# Patient Record
Sex: MALE | Race: WHITE | NOT HISPANIC OR LATINO | Employment: OTHER | URBAN - METROPOLITAN AREA
[De-identification: names, ages, dates, MRNs, and addresses within clinical notes are randomized per-mention and may not be internally consistent; named-entity substitution may affect disease eponyms.]

---

## 2020-01-16 ENCOUNTER — OFFICE VISIT (OUTPATIENT)
Dept: FAMILY MEDICINE CLINIC | Facility: CLINIC | Age: 55
End: 2020-01-16
Payer: COMMERCIAL

## 2020-01-16 VITALS
DIASTOLIC BLOOD PRESSURE: 88 MMHG | OXYGEN SATURATION: 96 % | SYSTOLIC BLOOD PRESSURE: 138 MMHG | HEART RATE: 96 BPM | RESPIRATION RATE: 16 BRPM | TEMPERATURE: 98 F | HEIGHT: 68 IN | WEIGHT: 202.6 LBS | BODY MASS INDEX: 30.71 KG/M2

## 2020-01-16 DIAGNOSIS — F41.9 ANXIETY: Primary | ICD-10-CM

## 2020-01-16 DIAGNOSIS — Z13.6 SCREENING FOR CARDIOVASCULAR CONDITION: ICD-10-CM

## 2020-01-16 DIAGNOSIS — N52.8 OTHER MALE ERECTILE DYSFUNCTION: ICD-10-CM

## 2020-01-16 DIAGNOSIS — Z12.5 PROSTATE CANCER SCREENING: ICD-10-CM

## 2020-01-16 PROCEDURE — 3008F BODY MASS INDEX DOCD: CPT | Performed by: FAMILY MEDICINE

## 2020-01-16 PROCEDURE — 99203 OFFICE O/P NEW LOW 30 MIN: CPT | Performed by: FAMILY MEDICINE

## 2020-01-16 RX ORDER — VARDENAFIL HYDROCHLORIDE 20 MG/1
20 TABLET ORAL DAILY PRN
Qty: 4 TABLET | Refills: 10 | Status: SHIPPED | OUTPATIENT
Start: 2020-01-16 | End: 2021-01-21 | Stop reason: SDUPTHER

## 2020-01-16 RX ORDER — VENLAFAXINE HYDROCHLORIDE 37.5 MG/1
37.5 CAPSULE, EXTENDED RELEASE ORAL DAILY
Qty: 90 CAPSULE | Refills: 3 | Status: SHIPPED | OUTPATIENT
Start: 2020-01-16 | End: 2021-02-19 | Stop reason: SDUPTHER

## 2020-01-16 RX ORDER — VARDENAFIL HYDROCHLORIDE 20 MG/1
TABLET ORAL
COMMUNITY
Start: 2019-12-02 | End: 2020-01-16 | Stop reason: SDUPTHER

## 2020-01-16 RX ORDER — VENLAFAXINE HYDROCHLORIDE 37.5 MG/1
37.5 CAPSULE, EXTENDED RELEASE ORAL DAILY
COMMUNITY
End: 2020-01-16 | Stop reason: SDUPTHER

## 2020-01-16 NOTE — PROGRESS NOTES
Subjective:           Problem List Items Addressed This Visit     None              No orders of the defined types were placed in this encounter  Patient Instructions   Stay current with colon and prostate screening Labs as orderwd  BMI Counseling: There is no height or weight on file to calculate BMI  Discussed the patient's BMI with him  The BMI is above normal  Nutrition recommendations include decreasing overall calorie intake and moderation in carbohydrate intake  Mliss Milo    No chief complaint on file  HPI Sera Funez is in for evaluation follow-up medication review refills as needed  He is 57-year-old male Anxiety ED    There were no vitals taken for this visit  Allergies not on file    No current outpatient medications on file prior to visit  No current facility-administered medications on file prior to visit  Past Medical History:   Diagnosis Date    Closed fracture of thoracic vertebral body ([facility] [address] ) 2014       No past surgical history on file  reports that he has never smoked  He does not have any smokeless tobacco history on file  He reports that he drinks alcohol  reports that he has never smoked  He does not have any smokeless tobacco history on file  Review of Systems   Constitutional: Negative for appetite change, diaphoresis and fever  HENT: Negative for congestion, ear pain, postnasal drip, sinus pressure, tinnitus and voice change  Eyes: Negative for discharge and itching  Respiratory: Negative for cough, chest tightness, shortness of breath and stridor  Cardiovascular: Negative for chest pain and palpitations  Gastrointestinal: Negative for abdominal distention, blood in stool, diarrhea and vomiting  Endocrine: Negative for cold intolerance  Genitourinary: Negative for flank pain, genital sores and testicular pain  Musculoskeletal: Negative for arthralgias, joint swelling, myalgias and neck stiffness     Skin: Negative for rash    Neurological: Negative for tremors, speech difficulty and headaches  Hematological: Negative for adenopathy  Psychiatric/Behavioral: Negative for behavioral problems, dysphoric mood, hallucinations and suicidal ideas  The patient is nervous/anxious  Physical Exam   Constitutional: He is oriented to person, place, and time  He appears well-developed and well-nourished  HENT:   Head: Normocephalic and atraumatic  Right Ear: External ear normal    Left Ear: External ear normal    Mouth/Throat: No oropharyngeal exudate  Eyes: Pupils are equal, round, and reactive to light  Conjunctivae and EOM are normal  Right eye exhibits no discharge  Left eye exhibits no discharge  No scleral icterus  Neck: Normal range of motion  Neck supple  No tracheal deviation present  Cardiovascular: Normal rate and regular rhythm  Exam reveals no friction rub  No murmur heard  Pulmonary/Chest: Effort normal and breath sounds normal  No stridor  No respiratory distress  He has no wheezes  He has no rales  Abdominal: Soft  Bowel sounds are normal  He exhibits no distension  There is no rebound and no guarding  Musculoskeletal: Normal range of motion  He exhibits no edema or deformity  Lymphadenopathy:     He has no cervical adenopathy  Neurological: He is alert and oriented to person, place, and time  No cranial nerve deficit  He exhibits normal muscle tone  Coordination normal    Skin: Skin is warm and dry  Capillary refill takes less than 2 seconds  No rash noted  Psychiatric: He has a normal mood and affect  His behavior is normal  Judgment and thought content normal    Nursing note and vitals reviewed

## 2020-01-28 LAB
ALBUMIN SERPL-MCNC: 4.6 G/DL (ref 3.8–4.9)
ALBUMIN/GLOB SERPL: 1.8 {RATIO} (ref 1.2–2.2)
ALP SERPL-CCNC: 51 IU/L (ref 39–117)
ALT SERPL-CCNC: 45 IU/L (ref 0–44)
AST SERPL-CCNC: 33 IU/L (ref 0–40)
BILIRUB SERPL-MCNC: 0.2 MG/DL (ref 0–1.2)
BUN SERPL-MCNC: 14 MG/DL (ref 6–24)
BUN/CREAT SERPL: 16 (ref 9–20)
CALCIUM SERPL-MCNC: 9.2 MG/DL (ref 8.7–10.2)
CHLORIDE SERPL-SCNC: 103 MMOL/L (ref 96–106)
CHOLEST SERPL-MCNC: 234 MG/DL (ref 100–199)
CO2 SERPL-SCNC: 20 MMOL/L (ref 20–29)
CREAT SERPL-MCNC: 0.9 MG/DL (ref 0.76–1.27)
GLOBULIN SER-MCNC: 2.5 G/DL (ref 1.5–4.5)
GLUCOSE SERPL-MCNC: 108 MG/DL (ref 65–99)
HDLC SERPL-MCNC: 78 MG/DL
LDLC SERPL CALC-MCNC: 143 MG/DL (ref 0–99)
POTASSIUM SERPL-SCNC: 4 MMOL/L (ref 3.5–5.2)
PROT SERPL-MCNC: 7.1 G/DL (ref 6–8.5)
PSA SERPL-MCNC: 1.1 NG/ML (ref 0–4)
SL AMB EGFR AFRICAN AMERICAN: 113 ML/MIN/1.73
SL AMB EGFR NON AFRICAN AMERICAN: 98 ML/MIN/1.73
SL AMB VLDL CHOLESTEROL CALC: 13 MG/DL (ref 5–40)
SODIUM SERPL-SCNC: 139 MMOL/L (ref 134–144)
TRIGL SERPL-MCNC: 65 MG/DL (ref 0–149)

## 2020-02-17 ENCOUNTER — OFFICE VISIT (OUTPATIENT)
Dept: FAMILY MEDICINE CLINIC | Facility: CLINIC | Age: 55
End: 2020-02-17
Payer: COMMERCIAL

## 2020-02-17 VITALS
HEART RATE: 96 BPM | SYSTOLIC BLOOD PRESSURE: 148 MMHG | RESPIRATION RATE: 16 BRPM | WEIGHT: 205.6 LBS | OXYGEN SATURATION: 98 % | BODY MASS INDEX: 31.16 KG/M2 | HEIGHT: 68 IN | DIASTOLIC BLOOD PRESSURE: 82 MMHG | TEMPERATURE: 98.2 F

## 2020-02-17 DIAGNOSIS — M77.12 LATERAL EPICONDYLITIS OF LEFT ELBOW: ICD-10-CM

## 2020-02-17 DIAGNOSIS — Z00.00 PHYSICAL EXAM: Primary | ICD-10-CM

## 2020-02-17 DIAGNOSIS — N52.8 OTHER MALE ERECTILE DYSFUNCTION: ICD-10-CM

## 2020-02-17 DIAGNOSIS — E78.2 MIXED HYPERLIPIDEMIA: ICD-10-CM

## 2020-02-17 DIAGNOSIS — F41.9 ANXIETY: ICD-10-CM

## 2020-02-17 PROCEDURE — 99396 PREV VISIT EST AGE 40-64: CPT | Performed by: FAMILY MEDICINE

## 2020-02-17 PROCEDURE — 3008F BODY MASS INDEX DOCD: CPT | Performed by: FAMILY MEDICINE

## 2020-02-17 NOTE — PATIENT INSTRUCTIONS
Say current with colon and prostate screening Low carb low fat diet  Continue medicatiuons   Recent Results (from the past 1344 hour(s))   Comprehensive metabolic panel    Collection Time: 01/27/20  9:14 AM   Result Value Ref Range    Glucose, Random 108 (H) 65 - 99 mg/dL    BUN 14 6 - 24 mg/dL    Creatinine 0 90 0 76 - 1 27 mg/dL    eGFR Non  98 >59 mL/min/1 73    eGFR  113 >59 mL/min/1 73    SL AMB BUN/CREATININE RATIO 16 9 - 20    Sodium 139 134 - 144 mmol/L    Potassium 4 0 3 5 - 5 2 mmol/L    Chloride 103 96 - 106 mmol/L    CO2 20 20 - 29 mmol/L    CALCIUM 9 2 8 7 - 10 2 mg/dL    Protein, Total 7 1 6 0 - 8 5 g/dL    Albumin 4 6 3 8 - 4 9 g/dL    Globulin, Total 2 5 1 5 - 4 5 g/dL    Albumin/Globulin Ratio 1 8 1 2 - 2 2    TOTAL BILIRUBIN 0 2 0 0 - 1 2 mg/dL    Alk Phos Isoenzymes 51 39 - 117 IU/L    AST 33 0 - 40 IU/L    ALT 45 (H) 0 - 44 IU/L   Lipid panel    Collection Time: 01/27/20  9:14 AM   Result Value Ref Range    Cholesterol, Total 234 (H) 100 - 199 mg/dL    Triglycerides 65 0 - 149 mg/dL    HDL 78 >39 mg/dL    VLDL Cholesterol Calculated 13 5 - 40 mg/dL    LDL Direct 143 (H) 0 - 99 mg/dL   PSA Total, Diagnostic    Collection Time: 01/27/20  9:14 AM   Result Value Ref Range    Prostate Specific Antigen Total 1 1 0 0 - 4 0 ng/mL     Low salt diet     Goal BP <135/85     Red yeast rice 600mg daily at night

## 2020-10-20 ENCOUNTER — OFFICE VISIT (OUTPATIENT)
Dept: FAMILY MEDICINE CLINIC | Facility: CLINIC | Age: 55
End: 2020-10-20
Payer: COMMERCIAL

## 2020-10-20 VITALS
RESPIRATION RATE: 18 BRPM | HEART RATE: 110 BPM | HEIGHT: 68 IN | TEMPERATURE: 98.3 F | DIASTOLIC BLOOD PRESSURE: 102 MMHG | SYSTOLIC BLOOD PRESSURE: 140 MMHG | BODY MASS INDEX: 31.01 KG/M2 | OXYGEN SATURATION: 96 % | WEIGHT: 204.6 LBS

## 2020-10-20 DIAGNOSIS — Z12.5 SCREENING FOR PROSTATE CANCER: Primary | ICD-10-CM

## 2020-10-20 DIAGNOSIS — I83.813 VARICOSE VEINS OF BOTH LOWER EXTREMITIES WITH PAIN: Primary | ICD-10-CM

## 2020-10-20 PROCEDURE — 1036F TOBACCO NON-USER: CPT | Performed by: FAMILY MEDICINE

## 2020-10-20 PROCEDURE — 99213 OFFICE O/P EST LOW 20 MIN: CPT | Performed by: FAMILY MEDICINE

## 2020-11-19 ENCOUNTER — TELEPHONE (OUTPATIENT)
Dept: ADMINISTRATIVE | Facility: HOSPITAL | Age: 55
End: 2020-11-19

## 2020-11-20 ENCOUNTER — CONSULT (OUTPATIENT)
Dept: VASCULAR SURGERY | Facility: CLINIC | Age: 55
End: 2020-11-20
Payer: COMMERCIAL

## 2020-11-20 VITALS
WEIGHT: 204 LBS | TEMPERATURE: 97.7 F | HEART RATE: 108 BPM | RESPIRATION RATE: 18 BRPM | HEIGHT: 68 IN | SYSTOLIC BLOOD PRESSURE: 150 MMHG | DIASTOLIC BLOOD PRESSURE: 90 MMHG | BODY MASS INDEX: 30.92 KG/M2

## 2020-11-20 DIAGNOSIS — I83.813 VARICOSE VEINS OF BOTH LOWER EXTREMITIES WITH PAIN: ICD-10-CM

## 2020-11-20 PROCEDURE — 99204 OFFICE O/P NEW MOD 45 MIN: CPT | Performed by: PHYSICIAN ASSISTANT

## 2020-11-20 PROCEDURE — 3008F BODY MASS INDEX DOCD: CPT | Performed by: FAMILY MEDICINE

## 2020-12-22 ENCOUNTER — HOSPITAL ENCOUNTER (OUTPATIENT)
Dept: RADIOLOGY | Facility: HOSPITAL | Age: 55
Discharge: HOME | End: 2020-12-22
Payer: COMMERCIAL

## 2020-12-22 DIAGNOSIS — I83.813 VARICOSE VEINS OF BOTH LOWER EXTREMITIES WITH PAIN: ICD-10-CM

## 2020-12-22 PROCEDURE — 93971 EXTREMITY STUDY: CPT

## 2020-12-22 PROCEDURE — 93971 EXTREMITY STUDY: CPT | Performed by: SURGERY

## 2021-01-14 ENCOUNTER — TELEMEDICINE (OUTPATIENT)
Dept: FAMILY MEDICINE CLINIC | Facility: CLINIC | Age: 56
End: 2021-01-14
Payer: COMMERCIAL

## 2021-01-14 DIAGNOSIS — Z20.822 EXPOSURE TO COVID-19 VIRUS: Primary | ICD-10-CM

## 2021-01-14 PROCEDURE — 99213 OFFICE O/P EST LOW 20 MIN: CPT | Performed by: FAMILY MEDICINE

## 2021-01-14 NOTE — PROGRESS NOTES
Virtual Brief Visit    Assessment/Plan:    Problem List Items Addressed This Visit     None      Visit Diagnoses     Exposure to COVID-19 virus    -  Primary        -patient remains asymptomatic after 6 days, continue 10 day quarantine and return to work Monday  -call immediately if any symptoms, however if he develops shortness of breath severely go to the ER        Reason for visit is   Chief Complaint   Patient presents with    Virtual Brief Visit        Encounter provider Jeimy Ventura MD    Provider located at [address] [place] [id number]    Recent Visits  No visits were found meeting these conditions  Showing recent visits within past 7 days and meeting all other requirements     Today's Visits  Date Type Provider Dept   01/14/21 Telemedicine Jeimy Ventura MD [facility]   Showing today's visits and meeting all other requirements     Future Appointments  No visits were found meeting these conditions  Showing future appointments within next 150 days and meeting all other requirements        After connecting through telephone, the patient was identified by name and date of birth  Elizabeth Michel was informed that this is a telemedicine visit and that the visit is being conducted through telephone  My office door was closed  No one else was in the room  He acknowledged consent and understanding of privacy and security of the platform  The patient has agreed to participate and understands he can discontinue the visit at any time  Patient is aware this is a billable service  Subjective    Elizabeth Michel is a 50 y o  male presents today to this virtual visit about concerns for for COVID  Patient was exposed to person who had previously tested positive last Friday at work, he is a  works in a bar in  Patient does wear a mask at work    Patient is currently asymptomatic continues to wear a mask he is inquiring if he should be getting a test or if he may proceed to work on Monday after 10 days of quarantine total   HPI     Past Medical History:   Diagnosis Date    Closed fracture of thoracic vertebral body ([facility] [address] ) 2014       No past surgical history on file  Current Outpatient Medications   Medication Sig Dispense Refill    vardenafil (LEVITRA) 20 MG tablet Take 1 tablet (20 mg total) by mouth daily as needed for erectile dysfunction 4 tablet 10    venlafaxine (EFFEXOR-XR) 37 5 mg 24 hr capsule Take 1 capsule (37 5 mg total) by mouth daily 90 capsule 3     No current facility-administered medications for this visit  No Known Allergies    Review of Systems    There were no vitals filed for this visit  I spent 14 minutes directly with the patient during this visit    VIRTUAL VISIT DISCLAIMER    Roxanna Wilde acknowledges that he has consented to an online visit or consultation  He understands that the online visit is based solely on information provided by him, and that, in the absence of a face-to-face physical evaluation by the physician, the diagnosis he receives is both limited and provisional in terms of accuracy and completeness  This is not intended to replace a full medical face-to-face evaluation by the physician  Roxanna Wilde understands and accepts these terms

## 2021-01-21 DIAGNOSIS — N52.8 OTHER MALE ERECTILE DYSFUNCTION: ICD-10-CM

## 2021-01-21 RX ORDER — VARDENAFIL HYDROCHLORIDE 20 MG/1
20 TABLET ORAL DAILY PRN
Qty: 4 TABLET | Refills: 10 | Status: SHIPPED | OUTPATIENT
Start: 2021-01-21 | End: 2021-12-21 | Stop reason: SDUPTHER

## 2021-02-19 ENCOUNTER — TELEPHONE (OUTPATIENT)
Dept: ADMINISTRATIVE | Facility: OTHER | Age: 56
End: 2021-02-19

## 2021-02-19 ENCOUNTER — OFFICE VISIT (OUTPATIENT)
Dept: FAMILY MEDICINE CLINIC | Facility: CLINIC | Age: 56
End: 2021-02-19
Payer: COMMERCIAL

## 2021-02-19 VITALS
BODY MASS INDEX: 31.11 KG/M2 | DIASTOLIC BLOOD PRESSURE: 100 MMHG | HEART RATE: 103 BPM | TEMPERATURE: 97.8 F | WEIGHT: 205.3 LBS | HEIGHT: 68 IN | OXYGEN SATURATION: 98 % | SYSTOLIC BLOOD PRESSURE: 152 MMHG | RESPIRATION RATE: 18 BRPM

## 2021-02-19 DIAGNOSIS — E78.2 MIXED HYPERLIPIDEMIA: ICD-10-CM

## 2021-02-19 DIAGNOSIS — F41.9 ANXIETY: ICD-10-CM

## 2021-02-19 DIAGNOSIS — I83.813 VARICOSE VEINS OF BOTH LOWER EXTREMITIES WITH PAIN: ICD-10-CM

## 2021-02-19 DIAGNOSIS — Z12.5 SCREENING FOR PROSTATE CANCER: ICD-10-CM

## 2021-02-19 DIAGNOSIS — Z00.00 PHYSICAL EXAM: Primary | ICD-10-CM

## 2021-02-19 DIAGNOSIS — R03.0 ELEVATED BLOOD PRESSURE READING: ICD-10-CM

## 2021-02-19 DIAGNOSIS — Z12.11 SCREEN FOR COLON CANCER: ICD-10-CM

## 2021-02-19 PROCEDURE — 99396 PREV VISIT EST AGE 40-64: CPT | Performed by: FAMILY MEDICINE

## 2021-02-19 RX ORDER — VENLAFAXINE HYDROCHLORIDE 37.5 MG/1
37.5 CAPSULE, EXTENDED RELEASE ORAL DAILY
Qty: 90 CAPSULE | Refills: 3 | Status: SHIPPED | OUTPATIENT
Start: 2021-02-19 | End: 2022-02-28 | Stop reason: SDUPTHER

## 2021-02-19 RX ORDER — LISINOPRIL 10 MG/1
10 TABLET ORAL DAILY
Qty: 90 TABLET | Refills: 3 | Status: SHIPPED | OUTPATIENT
Start: 2021-02-19

## 2021-02-19 NOTE — TELEPHONE ENCOUNTER
----- Message from Caesar Villanueva sent at 2/19/2021 10:55 AM EST -----  Regarding: care gap request  02/19/21 10:55 AM    Hello, our patient attached above has had CRC: Colonoscopy completed/performed  Please assist in updating the patient chart by making an External outreach to [facility] located in [place], [address] [place] [address]  The date of service is around June 2020      Thank you,  NEHEMIAS Villanueva PG

## 2021-02-19 NOTE — PROGRESS NOTES
Subjective:           Problem List Items Addressed This Visit        Cardiovascular and Mediastinum    Varicose veins of both lower extremities with pain       Other    Anxiety    Relevant Medications    venlafaxine (EFFEXOR-XR) 37 5 mg 24 hr capsule    Physical exam - Primary    Mixed hyperlipidemia    Relevant Orders    Comprehensive metabolic panel    Lipid panel    Elevated blood pressure reading    Relevant Medications    lisinopril (ZESTRIL) 10 mg tablet      Other Visit Diagnoses     Screening for prostate cancer        Relevant Orders    PSA, Total Screen    Screen for colon cancer                  Orders Placed This Encounter   Procedures    Comprehensive metabolic panel     This is a patient instruction: Patient fasting for 8 hours or longer recommended  Standing Status:   Future     Standing Expiration Date:   2/19/2022    Lipid panel     This is a patient instruction: This test requires patient fasting for 10-12 hours or longer  Drinking of black coffee or black tea is acceptable  Standing Status:   Future     Standing Expiration Date:   2/19/2022    PSA, Total Screen     This is a patient instruction: This test is non-fasting  Please drink two glasses of water morning of bloodwork  Standing Status:   Future     Standing Expiration Date:   2/19/2022       Patient Instructions    Stay current with labs colorectal screening      goal BP < 135/85    BMI Counseling: Body mass index is 31 68 kg/m²  Discussed the patient's BMI with him  The BMI is above normal  Nutrition recommendations include 3-5 servings of fruits/vegetables daily, consuming healthier snacks, moderation in carbohydrate intake and reducing intake of saturated fat and trans fat      Nani Whittaker    Chief Complaint   Patient presents with    Annual Exam     Needs new orders for labwork    Medication Refill     Effexor     HPI tend is in for annual physical history of ED history of closed fracture thoracic varicose veins anxiety on medication  Needs labs  Colorectal screening    /100 (BP Location: Left arm, Patient Position: Sitting, Cuff Size: Large)   Pulse 103   Temp 97 8 °F (36 6 °C) (Tympanic)   Resp 18   Ht 5' 7 5" (1 715 m)   Wt 93 1 kg (205 lb 4 8 oz)   SpO2 98%   BMI 31 68 kg/m²       No Known Allergies    Current Outpatient Medications on File Prior to Visit   Medication Sig Dispense Refill    vardenafil (LEVITRA) 20 MG tablet Take 1 tablet (20 mg total) by mouth daily as needed for erectile dysfunction 4 tablet 10    [DISCONTINUED] venlafaxine (EFFEXOR-XR) 37 5 mg 24 hr capsule Take 1 capsule (37 5 mg total) by mouth daily 90 capsule 3     No current facility-administered medications on file prior to visit  Past Medical History:   Diagnosis Date    Closed fracture of thoracic vertebral body ([facility] [address] ) 2014       History reviewed  No pertinent surgical history  reports that he has never smoked  He has never used smokeless tobacco  He reports current alcohol use  He reports that he does not use drugs  reports that he has never smoked  He has never used smokeless tobacco         Review of Systems   Constitutional: Negative for appetite change, diaphoresis and fever  HENT: Negative for congestion, ear pain, postnasal drip, sinus pressure, tinnitus and voice change  Eyes: Negative for discharge and itching  Respiratory: Negative for cough, chest tightness, shortness of breath and stridor  Cardiovascular: Negative for chest pain and palpitations  Gastrointestinal: Negative for abdominal distention, blood in stool, diarrhea and vomiting  Endocrine: Negative for cold intolerance  Genitourinary: Negative for flank pain, genital sores and testicular pain  Musculoskeletal: Negative for arthralgias, joint swelling, myalgias and neck stiffness  Skin: Negative for rash  Neurological: Negative for dizziness, tremors, speech difficulty and headaches     Hematological: Negative for adenopathy  Psychiatric/Behavioral: Negative for behavioral problems, confusion, dysphoric mood, hallucinations, self-injury and suicidal ideas  The patient is nervous/anxious  The patient is not hyperactive  Physical Exam  Vitals signs and nursing note reviewed  Constitutional:       Appearance: He is well-developed  HENT:      Head: Normocephalic and atraumatic  Right Ear: External ear normal       Left Ear: External ear normal       Nose: Nose normal       Mouth/Throat:      Pharynx: No oropharyngeal exudate  Eyes:      General: No scleral icterus  Right eye: No discharge  Left eye: No discharge  Conjunctiva/sclera: Conjunctivae normal       Pupils: Pupils are equal, round, and reactive to light  Neck:      Musculoskeletal: Normal range of motion and neck supple  Trachea: No tracheal deviation  Cardiovascular:      Rate and Rhythm: Normal rate and regular rhythm  Heart sounds: No murmur  No friction rub  Pulmonary:      Effort: Pulmonary effort is normal  No respiratory distress  Breath sounds: Normal breath sounds  No stridor  No wheezing or rales  Abdominal:      General: Bowel sounds are normal  There is no distension  Palpations: Abdomen is soft  There is no mass  Tenderness: There is no guarding or rebound  Genitourinary:     Prostate: Normal       Comments: Alt year choice f/u PSA  Musculoskeletal: Normal range of motion  General: No tenderness or deformity  Comments: Tender L lateral     varicose veins   Lymphadenopathy:      Cervical: No cervical adenopathy  Skin:     General: Skin is warm and dry  Capillary Refill: Capillary refill takes less than 2 seconds  Findings: No rash  Neurological:      General: No focal deficit present  Mental Status: He is alert and oriented to person, place, and time  Cranial Nerves: No cranial nerve deficit  Motor: No abnormal muscle tone        Coordination: Coordination normal    Psychiatric:         Behavior: Behavior normal          Thought Content:  Thought content normal          Judgment: Judgment normal

## 2021-02-19 NOTE — LETTER
Procedure Request Form: Colonoscopy      Date Requested: 21  Patient: Jeffory Kennel  Patient : 1965   Referring Provider: Sharmin Dean, MD        Date of Procedure ______________________________       The above patient has informed us that they have completed their   most recent Colonoscopy at your facility  Please complete   this form and attach all corresponding procedure reports/results  Comments __________________________________________________________  ____________________________________________________________________  ____________________________________________________________________  ____________________________________________________________________    Facility Completing Procedure _________________________________________    Form Completed By (print name) _______________________________________      Signature __________________________________________________________      These reports are needed for  compliance    Please fax this completed form and a copy of the procedure report to our office located at [place] [address] as soon as possible to [phone] [facility] [place]: Phone [phone]    We thank you for your assistance in treating our mutual patient

## 2021-02-19 NOTE — TELEPHONE ENCOUNTER
Upon review of the In Basket request and the patient's chart, initial outreach has been made via fax, please see Contacts section for details       Thank you  Juan Pablo London MA

## 2021-02-19 NOTE — LETTER
Procedure Request Form: Colonoscopy      Date Requested: 21  Patient: Trudyprince Martinezet  Patient : 1965   Referring Provider: Broderick Dumont, MD        Date of Procedure ______________________________       The above patient has informed us that they have completed their   most recent Colonoscopy at your facility  Please complete   this form and attach all corresponding procedure reports/results  Comments __________________________________________________________  ____________________________________________________________________  ____________________________________________________________________  ____________________________________________________________________    Facility Completing Procedure _________________________________________    Form Completed By (print name) _______________________________________      Signature __________________________________________________________      These reports are needed for  compliance    Please fax this completed form and a copy of the procedure report to our office located at [place] [address] as soon as possible to [phone] [facility]: Phone [phone]    We thank you for your assistance in treating our mutual patient

## 2021-02-23 ENCOUNTER — TRANSCRIBE ORDERS (OUTPATIENT)
Dept: VASCULAR SURGERY | Facility: CLINIC | Age: 56
End: 2021-02-23

## 2021-02-23 DIAGNOSIS — I83.813 VARICOSE VEINS OF BOTH LOWER EXTREMITIES WITH PAIN: Primary | ICD-10-CM

## 2021-02-25 ENCOUNTER — TELEPHONE (OUTPATIENT)
Dept: VASCULAR SURGERY | Facility: CLINIC | Age: 56
End: 2021-02-25

## 2021-02-25 NOTE — TELEPHONE ENCOUNTER
As a follow-up, a second attempt has been made for outreach via fax, please see Contacts section for details      Thank you  Mario Vásquez MA

## 2021-02-25 NOTE — TELEPHONE ENCOUNTER
COVID Pre-Visit Screening     1  Is this a family member screening? No  2  Have you traveled outside of your state in the past 2 weeks? No  3  Do you presently have a fever or flu-like symptoms? No  4  Do you have symptoms of an upper respiratory infection like runny nose, sore throat, or cough? No  5  Are you suffering from new headache that you have not had in the past?  No  6  Do you have/have you experienced any new shortness of breath recently? No  7  Do you have any new diarrhea, nausea or vomiting? No  8  Have you been in contact with anyone who has been sick or diagnosed with COVID-19? No  9  Do you have any new loss of taste or smell? No  10  Are you able to wear a mask without a valve for the entire visit?  Yes     Patient coming in alone, no to all screening questions

## 2021-02-26 ENCOUNTER — TELEPHONE (OUTPATIENT)
Dept: FAMILY MEDICINE CLINIC | Facility: CLINIC | Age: 56
End: 2021-02-26

## 2021-02-26 ENCOUNTER — OFFICE VISIT (OUTPATIENT)
Dept: VASCULAR SURGERY | Facility: CLINIC | Age: 56
End: 2021-02-26
Payer: COMMERCIAL

## 2021-02-26 VITALS
HEIGHT: 68 IN | BODY MASS INDEX: 31.52 KG/M2 | SYSTOLIC BLOOD PRESSURE: 132 MMHG | DIASTOLIC BLOOD PRESSURE: 84 MMHG | RESPIRATION RATE: 18 BRPM | HEART RATE: 100 BPM | TEMPERATURE: 98.4 F | WEIGHT: 208 LBS

## 2021-02-26 DIAGNOSIS — I83.813 VARICOSE VEINS OF BOTH LOWER EXTREMITIES WITH PAIN: Primary | ICD-10-CM

## 2021-02-26 PROCEDURE — 3008F BODY MASS INDEX DOCD: CPT | Performed by: SURGERY

## 2021-02-26 PROCEDURE — 99213 OFFICE O/P EST LOW 20 MIN: CPT | Performed by: SURGERY

## 2021-02-26 PROCEDURE — 1036F TOBACCO NON-USER: CPT | Performed by: SURGERY

## 2021-02-26 NOTE — PROGRESS NOTES
Assessment/Plan:    Varicose veins of both lower extremities with pain  Symptomatic venous insufficiency marked by varicose veins and pain with mild swelling  Compression stocking therapy has been effective  Patient instructed to continue with compression therapy and leg elevation as well as moderate exercise  Patient will return if the pain worsens despite compression therapy, leg elevation and moderate exercise  Diagnoses and all orders for this visit:    Varicose veins of both lower extremities with pain          Subjective:      Patient ID: Trudy Piña is a 49 y o  male  Patient presents in office to review the results of his LEVDR done on 12/22/2020 which shows deep and superficial venous incompetence in the right lower extremity  The saphenous vein measures 4 mm at the knee and 9 mm at the groin  Patient reports since his last visit with us the swelling has gone down and is no longer noticeable  Patient reports the R leg pain and achy feeling has subsided  Patient reports he wears his compression stockings daily which have been effective  I discussed the results of the venous valvular insufficiency study with the patient  I have told him to continue daily use of the compression stockings and leg elevation at night with moderate exercise  If the pain becomes significant he will then return to consider endovenous ablation and possible microphlebectomy  Patient denies any wounds or ulcers  The following portions of the patient's history were reviewed and updated as appropriate: allergies, current medications, past family history, past medical history, past social history, past surgical history and problem list     Review of Systems   Constitutional: Negative  Negative for chills and fever  HENT: Negative  Eyes: Negative  Respiratory: Negative  Negative for cough, chest tightness and shortness of breath  Cardiovascular: Negative  Negative for leg swelling  Gastrointestinal: Negative  Negative for diarrhea, nausea and vomiting  Endocrine: Negative  Genitourinary: Negative  Musculoskeletal: Negative  Negative for gait problem  Skin: Negative  Negative for wound  Allergic/Immunologic: Negative  Neurological: Negative  Negative for dizziness, speech difficulty, weakness, numbness and headaches  Hematological: Negative  Does not bruise/bleed easily  Psychiatric/Behavioral: Negative  Negative for self-injury and suicidal ideas  Objective:      /84 (BP Location: Left arm, Patient Position: Sitting, Cuff Size: Adult)   Pulse 100   Temp 98 4 °F (36 9 °C) (Tympanic)   Resp 18   Ht 5' 7 5" (1 715 m)   Wt 94 3 kg (208 lb)   BMI 32 10 kg/m²          Physical Exam  Vitals signs and nursing note reviewed  Constitutional:       Appearance: He is well-developed  HENT:      Head: Normocephalic and atraumatic  Eyes:      Conjunctiva/sclera: Conjunctivae normal       Pupils: Pupils are equal, round, and reactive to light  Neck:      Musculoskeletal: Normal range of motion and neck supple  Vascular: No JVD  Pulmonary:      Effort: No respiratory distress  Breath sounds: No stridor  No wheezing or rales  Chest:      Chest wall: No tenderness  Abdominal:      General: There is no distension  Palpations: There is no mass  Tenderness: There is no abdominal tenderness  There is no guarding or rebound  Musculoskeletal: Normal range of motion  General: No tenderness or deformity  Skin:     General: Skin is warm and dry  Findings: No erythema or rash  Comments: Multiple varicosities at the medial and posterior right calf  Mild swelling present   Neurological:      Mental Status: He is alert and oriented to person, place, and time  Psychiatric:         Behavior: Behavior normal          Thought Content:  Thought content normal

## 2021-02-26 NOTE — LETTER
February 26, 2021     MD Ingrid Kulkarni [address]  [id number]    Patient: Roxanna Wilde   YOB: 1965   Date of Visit: 2/26/2021       Dear Dr Deann Arguello:    Thank you for referring Roxanna Wilde to me for evaluation  Below are the relevant portions of my assessment and plan of care  Patient presents in office to review the results of his LEVDR done on 12/22/2020 which shows deep and superficial venous incompetence in the right lower extremity  The saphenous vein measures 4 mm at the knee and 9 mm at the groin  Patient reports since his last visit with us the swelling has gone down and is no longer noticeable  Patient reports the R leg pain and achy feeling has subsided  Patient reports he wears his compression stockings daily which have been effective  I discussed the results of the venous valvular insufficiency study with the patient  I have told him to continue daily use of the compression stockings and leg elevation at night with moderate exercise  If the pain becomes significant he will then return to consider endovenous ablation and possible microphlebectomy  Patient denies any wounds or ulcers  Assessment/Plan:    Varicose veins of both lower extremities with pain  Symptomatic venous insufficiency marked by varicose veins and pain with mild swelling  Compression stocking therapy has been effective  Patient instructed to continue with compression therapy and leg elevation as well as moderate exercise  Patient will return if the pain worsens despite compression therapy, leg elevation and moderate exercise  If you have questions, please do not hesitate to call me  I look forward to following Jessica Hutchinson along with you         Sincerely,        Hardik Cruz MD        CC: No Recipients

## 2021-02-26 NOTE — ASSESSMENT & PLAN NOTE
Symptomatic venous insufficiency marked by varicose veins and pain with mild swelling  Compression stocking therapy has been effective  Patient instructed to continue with compression therapy and leg elevation as well as moderate exercise  Patient will return if the pain worsens despite compression therapy, leg elevation and moderate exercise

## 2021-02-26 NOTE — TELEPHONE ENCOUNTER
Dropped off BP tracking for Dr Raquel Angulo    Results are scanned into this encounter  Copy has been placed in the [facility] by [employer]     Thank you

## 2021-03-02 NOTE — TELEPHONE ENCOUNTER
Upon review of the In Basket request we received fax back with note not our patient  Any additional questions or concerns should be emailed to the Practice Liaisons via [email or web address]  [email or web address] email, please do not reply via In Basket      Thank you  Nuvia Marin MA

## 2021-12-21 ENCOUNTER — TELEPHONE (OUTPATIENT)
Dept: FAMILY MEDICINE CLINIC | Facility: CLINIC | Age: 56
End: 2021-12-21

## 2021-12-21 DIAGNOSIS — Z11.52 ENCOUNTER FOR SCREENING FOR COVID-19: Primary | ICD-10-CM

## 2021-12-21 DIAGNOSIS — N52.8 OTHER MALE ERECTILE DYSFUNCTION: ICD-10-CM

## 2021-12-21 PROCEDURE — U0003 INFECTIOUS AGENT DETECTION BY NUCLEIC ACID (DNA OR RNA); SEVERE ACUTE RESPIRATORY SYNDROME CORONAVIRUS 2 (SARS-COV-2) (CORONAVIRUS DISEASE [COVID-19]), AMPLIFIED PROBE TECHNIQUE, MAKING USE OF HIGH THROUGHPUT TECHNOLOGIES AS DESCRIBED BY CMS-2020-01-R: HCPCS

## 2021-12-21 PROCEDURE — U0005 INFEC AGEN DETEC AMPLI PROBE: HCPCS

## 2021-12-21 RX ORDER — VARDENAFIL HYDROCHLORIDE 20 MG/1
20 TABLET ORAL DAILY PRN
Qty: 4 TABLET | Refills: 10 | Status: SHIPPED | OUTPATIENT
Start: 2021-12-21

## 2022-02-28 ENCOUNTER — OFFICE VISIT (OUTPATIENT)
Dept: FAMILY MEDICINE CLINIC | Facility: CLINIC | Age: 57
End: 2022-02-28
Payer: COMMERCIAL

## 2022-02-28 VITALS
SYSTOLIC BLOOD PRESSURE: 142 MMHG | TEMPERATURE: 97.4 F | HEIGHT: 68 IN | DIASTOLIC BLOOD PRESSURE: 90 MMHG | WEIGHT: 214.2 LBS | BODY MASS INDEX: 32.46 KG/M2 | HEART RATE: 106 BPM | OXYGEN SATURATION: 97 % | RESPIRATION RATE: 18 BRPM

## 2022-02-28 DIAGNOSIS — F41.9 ANXIETY: ICD-10-CM

## 2022-02-28 DIAGNOSIS — R06.83 SNORING: ICD-10-CM

## 2022-02-28 DIAGNOSIS — Z12.11 SCREENING FOR COLORECTAL CANCER: ICD-10-CM

## 2022-02-28 DIAGNOSIS — Z00.00 ANNUAL PHYSICAL EXAM: Primary | ICD-10-CM

## 2022-02-28 DIAGNOSIS — Z72.89 ALCOHOL USE: ICD-10-CM

## 2022-02-28 DIAGNOSIS — Z11.59 NEED FOR HEPATITIS C SCREENING TEST: ICD-10-CM

## 2022-02-28 DIAGNOSIS — Z11.4 SCREENING FOR HIV (HUMAN IMMUNODEFICIENCY VIRUS): ICD-10-CM

## 2022-02-28 DIAGNOSIS — Z12.12 SCREENING FOR COLORECTAL CANCER: ICD-10-CM

## 2022-02-28 DIAGNOSIS — Z23 ENCOUNTER FOR IMMUNIZATION: ICD-10-CM

## 2022-02-28 PROBLEM — M77.12 LATERAL EPICONDYLITIS OF LEFT ELBOW: Status: RESOLVED | Noted: 2020-02-17 | Resolved: 2022-02-28

## 2022-02-28 PROCEDURE — 99396 PREV VISIT EST AGE 40-64: CPT | Performed by: FAMILY MEDICINE

## 2022-02-28 RX ORDER — VENLAFAXINE HYDROCHLORIDE 37.5 MG/1
37.5 CAPSULE, EXTENDED RELEASE ORAL DAILY
Qty: 90 CAPSULE | Refills: 3 | Status: SHIPPED | OUTPATIENT
Start: 2022-02-28

## 2022-03-01 NOTE — PROGRESS NOTES
[address]    NAME: Leopoldo Razor  AGE: 56 y o  SEX: male  : 1965     DATE: 3/5/2022     Assessment and Plan:     Problem List Items Addressed This Visit        Other    Anxiety    Relevant Medications    venlafaxine (EFFEXOR-XR) 37 5 mg 24 hr capsule      Other Visit Diagnoses     Annual physical exam    -  Primary    Relevant Orders    Hemoglobin A1C    Lipid panel    Comprehensive metabolic panel    Screening for colorectal cancer        Relevant Orders    Ambulatory referral to Gastroenterology    BMI 32 0-32 9,adult        Relevant Orders    Hemoglobin A1C    Lipid panel    Comprehensive metabolic panel    Snoring        Relevant Orders    Ambulatory Referral to Pulmonology    Alcohol use        Relevant Orders    Hemoglobin A1C    Lipid panel    Comprehensive metabolic panel          Immunizations and preventive care screenings were discussed with patient today  Appropriate education was printed on patient's after visit summary  Counseling:  Alcohol/drug use: discussed moderation in alcohol intake, the recommendations for healthy alcohol use, and avoidance of illicit drug use  Injury prevention: discussed safety/seat belts, safety helmets, smoke detectors, carbon dioxide detectors, and smoking near bedding or upholstery  Sexual health: discussed sexually transmitted diseases, partner selection, use of condoms, avoidance of unintended pregnancy, and contraceptive alternatives  · Exercise: the importance of regular exercise/physical activity was discussed  Recommend exercise 3-5 times per week for at least 30 minutes  BMI Counseling: Body mass index is 32 57 kg/m²   The BMI is above normal  Nutrition recommendations include decreasing portion sizes, encouraging healthy choices of fruits and vegetables, decreasing fast food intake, consuming healthier snacks, limiting drinks that contain sugar, moderation in carbohydrate intake, increasing intake of lean protein and reducing intake of cholesterol  Exercise recommendations include exercising 3-5 times per week  Rationale for BMI follow-up plan is due to patient being overweight or obese  Depression Screening and Follow-up Plan: Patient was screened for depression during today's encounter  They screened negative with a PHQ-2 score of 0  Return in 2 weeks (on 3/14/2022)  Chief Complaint:     Chief Complaint   Patient presents with    Physical Exam     has questions about results regarding colonscopy, we do noty have those results    GERD     for past year or so, has been taking tums for symptom relief    Hypertension      History of Present Illness:     Adult Annual Physical   Patient here for a comprehensive physical exam  The patient reports no problems  Patient was told about his snoring from his wife  Patient reported he does get some unrefreshing sleep at night and his wife frequently has to nudge him awake to take a breath  Patient reports she was never assessed for his sleep med and would like to be  Patient also had a concern about his prior colonoscopy  On patient's chart patient still needed colonoscopy however he said he had 1 approximately 3 years ago and believes they found some abnormal findings however was never followed up  Patient was encouraged to get the medical information so we can update his chart and follow-up  Patient also had some concerned about his weight stating that he just can not lose the lb even though he exercises 3-5 days week  Patient denied any shortness of breath, chest pain, nausea, vomiting, fever, chills, change in bowel habits/urination  Diet and Physical Activity  · Diet/Nutrition: well balanced diet, limited junk food and limited fruits/vegetables  2  · Exercise: 3-4 times a week on average and 1-2 hours on average        Depression Screening  PHQ-2/9 Depression Screening    Little interest or pleasure in doing things: 0 - not at all  Feeling down, depressed, or hopeless: 0 - not at all  PHQ-2 Score: 0  PHQ-2 Interpretation: Negative depression screen       General Health  · Sleep: sleeps poorly, gets 7-8 hours of sleep on average, snores loudly, experiences daytime hypersomnolence and unrefreshing sleep  · Hearing: normal - bilateral   · Vision: goes for regular eye exams, most recent eye exam <1 year ago and wears glasses  · Dental: regular dental visits and brushes teeth twice daily   Health  · Symptoms include: erectile dysfunction     Review of Systems:     Review of Systems   Constitutional: Negative for activity change, appetite change, chills, diaphoresis, fatigue and fever  HENT: Negative for congestion, nosebleeds, postnasal drip, rhinorrhea, sinus pain, sore throat and trouble swallowing  Eyes: Negative for photophobia and visual disturbance  Respiratory: Negative for choking, shortness of breath and wheezing  Cardiovascular: Negative for chest pain, palpitations and leg swelling  Gastrointestinal: Negative for blood in stool, constipation, diarrhea, nausea and vomiting  Endocrine: Negative for polyuria  Genitourinary: Negative for difficulty urinating, hematuria, penile discharge, penile pain and testicular pain  Skin: Negative for color change, pallor and rash  Neurological: Negative for dizziness, tremors, weakness, light-headedness, numbness and headaches  Psychiatric/Behavioral: Negative for agitation and decreased concentration  Past Medical History:     Past Medical History:   Diagnosis Date    Closed fracture of thoracic vertebral body ([facility] [address] ) 2014      Past Surgical History:     History reviewed  No pertinent surgical history     Family History:     Family History   Problem Relation Age of Onset    Ovarian cancer Family     Prostate cancer Family       Social History:     Social History     Socioeconomic History    Marital status: /Civil Union     Spouse name: None    Number of children: None    Years of education: None    Highest education level: None   Occupational History    None   Tobacco Use    Smoking status: Never Smoker    Smokeless tobacco: Never Used   Vaping Use    Vaping Use: Never used   Substance and Sexual Activity    Alcohol use: Yes    Drug use: Never    Sexual activity: Yes     Partners: Female   Other Topics Concern    None   Social History Narrative    None     Social Determinants of Health     Financial Resource Strain: Not on file   Food Insecurity: Not on file   Transportation Needs: Not on file   Physical Activity: Not on file   Stress: Not on file   Social Connections: Not on file   Intimate Partner Violence: Not on file   Housing Stability: Not on file      Current Medications:     Current Outpatient Medications   Medication Sig Dispense Refill    vardenafil (LEVITRA) 20 MG tablet Take 1 tablet (20 mg total) by mouth daily as needed for erectile dysfunction 4 tablet 10    venlafaxine (EFFEXOR-XR) 37 5 mg 24 hr capsule Take 1 capsule (37 5 mg total) by mouth daily 90 capsule 3    lisinopril (ZESTRIL) 10 mg tablet Take 1 tablet (10 mg total) by mouth daily (Patient not taking: Reported on 2/26/2021) 90 tablet 3     No current facility-administered medications for this visit  Allergies:     No Known Allergies   Physical Exam:     /90   Pulse (!) 106   Temp (!) 97 4 °F (36 3 °C) (Tympanic)   Resp 18   Ht 5' 8" (1 727 m)   Wt 97 2 kg (214 lb 3 2 oz)   SpO2 97%   BMI 32 57 kg/m²     Physical Exam  Constitutional:       Appearance: Normal appearance  HENT:      Head: Normocephalic  Right Ear: Tympanic membrane, ear canal and external ear normal       Left Ear: Tympanic membrane, ear canal and external ear normal       Mouth/Throat:      Mouth: Mucous membranes are moist       Pharynx: Oropharynx is clear  Eyes:      General: No scleral icterus  Extraocular Movements: Extraocular movements intact  Conjunctiva/sclera: Conjunctivae normal       Pupils: Pupils are equal, round, and reactive to light  Cardiovascular:      Rate and Rhythm: Normal rate and regular rhythm  Pulses: Normal pulses  Heart sounds: Normal heart sounds  Pulmonary:      Effort: Pulmonary effort is normal  No respiratory distress  Breath sounds: Normal breath sounds  No stridor  No wheezing, rhonchi or rales  Abdominal:      General: Bowel sounds are normal       Palpations: Abdomen is soft  Musculoskeletal:         General: No swelling, deformity or signs of injury  Right lower leg: No edema  Left lower leg: No edema  Skin:     General: Skin is warm and dry  Capillary Refill: Capillary refill takes less than 2 seconds  Coloration: Skin is not jaundiced or pale  Findings: No erythema or rash  Neurological:      General: No focal deficit present  Mental Status: He is alert and oriented to person, place, and time  Mental status is at baseline  Psychiatric:         Mood and Affect: Mood normal          Behavior: Behavior normal          Thought Content:  Thought content normal           Gloria Melgoza MD  [address]

## 2022-03-01 NOTE — PATIENT INSTRUCTIONS
Wellness Visit for Adults   AMBULATORY CARE:   A wellness visit  is when you see your healthcare provider to get screened for health problems  Your healthcare provider will also give you advice on how to stay healthy  Write down your questions so you remember to ask them  Ask your healthcare provider how often you should have a wellness visit  What happens at a wellness visit:  Your healthcare provider will ask about your health, and your family history of health problems  This includes high blood pressure, heart disease, and cancer  He or she will ask if you have symptoms that concern you, if you smoke, and about your mood  You may also be asked about your intake of medicines, supplements, food, and alcohol  Any of the following may be done:  · Your weight  will be checked  Your height may also be checked so your body mass index (BMI) can be calculated  Your BMI shows if you are at a healthy weight  · Your blood pressure  and heart rate will be checked  Your temperature may also be checked  · Blood and urine tests  may be done  Blood tests may be done to check your cholesterol levels  Abnormal cholesterol levels increase your risk for heart disease and stroke  You may also need a blood or urine test to check for diabetes if you are at increased risk  Urine tests may be done to look for signs of an infection or kidney disease  · A physical exam  includes checking your heartbeat and lungs with a stethoscope  Your healthcare provider may also check your skin to look for sun damage  · Screening tests  may be recommended  A screening test is done to check for diseases that may not cause symptoms  The screening tests you may need depend on your age, gender, family history, and lifestyle habits  For example, colorectal screening may be recommended if you are 48years old or older  Screening tests you need if you are a woman:   · A Pap smear  is used to screen for cervical cancer   Pap smears are usually done every 3 to 5 years depending on your age  You may need them more often if you have had abnormal Pap smear test results in the past  Ask your healthcare provider how often you should have a Pap smear  · A mammogram  is an x-ray of your breasts to screen for breast cancer  Experts recommend mammograms every 2 years starting at age 48 years  You may need a mammogram at age 52 years or younger if you have an increased risk for breast cancer  Talk to your healthcare provider about when you should start having mammograms and how often you need them  Vaccines you may need:   · Get an influenza vaccine  every year  The influenza vaccine protects you from the flu  Several types of viruses cause the flu  The viruses change over time, so new vaccines are made each year  · Get a tetanus-diphtheria (Td) booster vaccine  every 10 years  This vaccine protects you against tetanus and diphtheria  Tetanus is a severe infection that may cause painful muscle spasms and lockjaw  Diphtheria is a severe bacterial infection that causes a thick covering in the back of your mouth and throat  · Get a human papillomavirus (HPV) vaccine  if you are female and aged 23 to 32 or male 23 to 24 and never received it  This vaccine protects you from HPV infection  HPV is the most common infection spread by sexual contact  HPV may also cause vaginal, penile, and anal cancers  · Get a pneumococcal vaccine  if you are aged 72 years or older  The pneumococcal vaccine is an injection given to protect you from pneumococcal disease  Pneumococcal disease is an infection caused by pneumococcal bacteria  The infection may cause pneumonia, meningitis, or an ear infection  · Get a shingles vaccine  if you are 60 or older, even if you have had shingles before  The shingles vaccine is an injection to protect you from the varicella-zoster virus  This is the same virus that causes chickenpox   Shingles is a painful rash that develops in people who had chickenpox or have been exposed to the virus  How to eat healthy:  My Plate is a model for planning healthy meals  It shows the types and amounts of foods that should go on your plate  Fruits and vegetables make up about half of your plate, and grains and protein make up the other half  A serving of dairy is included on the side of your plate  The amount of calories and serving sizes you need depends on your age, gender, weight, and height  Examples of healthy foods are listed below:  · Eat a variety of vegetables  such as dark green, red, and orange vegetables  You can also include canned vegetables low in sodium (salt) and frozen vegetables without added butter or sauces  · Eat a variety of fresh fruits , canned fruit in 100% juice, frozen fruit, and dried fruit  · Include whole grains  At least half of the grains you eat should be whole grains  Examples include whole-wheat bread, wheat pasta, brown rice, and whole-grain cereals such as oatmeal     · Eat a variety of protein foods such as seafood (fish and shellfish), lean meat, and poultry without skin (turkey and chicken)  Examples of lean meats include pork leg, shoulder, or tenderloin, and beef round, sirloin, tenderloin, and extra lean ground beef  Other protein foods include eggs and egg substitutes, beans, peas, soy products, nuts, and seeds  · Choose low-fat dairy products such as skim or 1% milk or low-fat yogurt, cheese, and cottage cheese  · Limit unhealthy fats  such as butter, hard margarine, and shortening  Exercise:  Exercise at least 30 minutes per day on most days of the week  Some examples of exercise include walking, biking, dancing, and swimming  You can also fit in more physical activity by taking the stairs instead of the elevator or parking farther away from stores  Include muscle strengthening activities 2 days each week  Regular exercise provides many health benefits   It helps you manage your weight, and decreases your risk for type 2 diabetes, heart disease, stroke, and high blood pressure  Exercise can also help improve your mood  Ask your healthcare provider about the best exercise plan for you  General health and safety guidelines:   · Do not smoke  Nicotine and other chemicals in cigarettes and cigars can cause lung damage  Ask your healthcare provider for information if you currently smoke and need help to quit  E-cigarettes or smokeless tobacco still contain nicotine  Talk to your healthcare provider before you use these products  · Limit alcohol  A drink of alcohol is 12 ounces of beer, 5 ounces of wine, or 1½ ounces of liquor  · Lose weight, if needed  Being overweight increases your risk of certain health conditions  These include heart disease, high blood pressure, type 2 diabetes, and certain types of cancer  · Protect your skin  Do not sunbathe or use tanning beds  Use sunscreen with a SPF 15 or higher  Apply sunscreen at least 15 minutes before you go outside  Reapply sunscreen every 2 hours  Wear protective clothing, hats, and sunglasses when you are outside  · Drive safely  Always wear your seatbelt  Make sure everyone in your car wears a seatbelt  A seatbelt can save your life if you are in an accident  Do not use your cell phone when you are driving  This could distract you and cause an accident  Pull over if you need to make a call or send a text message  · Practice safe sex  Use latex condoms if are sexually active and have more than one partner  Your healthcare provider may recommend screening tests for sexually transmitted infections (STIs)  · Wear helmets, lifejackets, and protective gear  Always wear a helmet when you ride a bike or motorcycle, go skiing, or play sports that could cause a head injury  Wear protective equipment when you play sports  Wear a lifejacket when you are on a boat or doing water sports      © Copyright [employer] 2022 Information is for End User's use only and may not be sold, redistributed or otherwise used for commercial purposes  All illustrations and images included in CareNotes® are the copyrighted property of [employer]  or [employer]  The above information is an  only  It is not intended as medical advice for individual conditions or treatments  Talk to your doctor, nurse or pharmacist before following any medical regimen to see if it is safe and effective for you  Weight Management   AMBULATORY CARE:   Why it is important to manage your weight:  Being overweight increases your risk of health conditions such as heart disease, high blood pressure, type 2 diabetes, and certain types of cancer  It can also increase your risk for osteoarthritis, sleep apnea, and other respiratory problems  Aim for a slow, steady weight loss  Even a small amount of weight loss can lower your risk of health problems  Risks of being overweight:  Extra weight can cause many health problems, including the following:  · Diabetes (high blood sugar level)    · High blood pressure or high cholesterol    · Heart disease    · Stroke    · Gallbladder or liver disease    · Cancer of the colon, breast, prostate, liver, or kidney    · Sleep apnea    · Arthritis or gout    Screening  is done to check for health conditions before you have signs or symptoms  If you are 28to 79years old, your blood sugar level may be checked every 3 years for signs of prediabetes or diabetes  Your healthcare provider will check your blood pressure at each visit  High blood pressure can lead to a stroke or other problems  Your provider may check for signs of heart disease, cancer, or other health problems  How to lose weight safely:  A safe and healthy way to lose weight is to eat fewer calories and get regular exercise  · You can lose up about 1 pound a week by decreasing the number of calories you eat by 500 calories each day   You can decrease calories by eating smaller portion sizes or by cutting out high-calorie foods  Read labels to find out how many calories are in the foods you eat  · You can also burn calories with exercise such as walking, swimming, or biking  You will be more likely to keep weight off if you make these changes part of your lifestyle  Exercise at least 30 minutes per day on most days of the week  You can also fit in more physical activity by taking the stairs instead of the elevator or parking farther away from stores  Ask your healthcare provider about the best exercise plan for you  Healthy meal plan for weight management:  A healthy meal plan includes a variety of foods, contains fewer calories, and helps you stay healthy  A healthy meal plan includes the following:     · Eat whole-grain foods more often  A healthy meal plan should contain fiber  Fiber is the part of grains, fruits, and vegetables that is not broken down by your body  Whole-grain foods are healthy and provide extra fiber in your diet  Some examples of whole-grain foods are whole-wheat breads and pastas, oatmeal, brown rice, and bulgur  · Eat a variety of vegetables every day  Include dark, leafy greens such as spinach, kale, janine greens, and mustard greens  Eat yellow and orange vegetables such as carrots, sweet potatoes, and winter squash  · Eat a variety of fruits every day  Choose fresh or canned fruit (canned in its own juice or light syrup) instead of juice  Fruit juice has very little or no fiber  · Eat low-fat dairy foods  Drink fat-free (skim) milk or 1% milk  Eat fat-free yogurt and low-fat cottage cheese  Try low-fat cheeses such as mozzarella and other reduced-fat cheeses  · Choose meat and other protein foods that are low in fat  Choose beans or other legumes such as split peas or lentils  Choose fish, skinless poultry (chicken or turkey), or lean cuts of red meat (beef or pork)   Before you cook meat or poultry, cut off any visible fat  · Use less fat and oil  Try baking foods instead of frying them  Add less fat, such as margarine, sour cream, regular salad dressing and mayonnaise to foods  Eat fewer high-fat foods  Some examples of high-fat foods include french fries, doughnuts, ice cream, and cakes  · Eat fewer sweets  Limit foods and drinks that are high in sugar  This includes candy, cookies, regular soda, and sweetened drinks  Ways to decrease calories:   · Eat smaller portions  ? Use a small plate with smaller servings  ? Do not eat second helpings  ? When you eat at a restaurant, ask for a box and place half of your meal in the box before you eat  ? Share an entrée with someone else  · Replace high-calorie snacks with healthy, low-calorie snacks  ? Choose fresh fruit, vegetables, fat-free rice cakes, or air-popped popcorn instead of potato chips, nuts, or chocolate  ? Choose water or calorie-free drinks instead of soda or sweetened drinks  · Do not shop for groceries when you are hungry  You may be more likely to make unhealthy food choices  Take a grocery list of healthy foods and shop after you have eaten  · Eat regular meals  Do not skip meals  Skipping meals can lead to overeating later in the day  This can make it harder for you to lose weight  Eat a healthy snack in place of a meal if you do not have time to eat a regular meal  Talk with a dietitian to help you create a meal plan and schedule that is right for you  Other things to consider as you try to lose weight:   · Be aware of situations that may give you the urge to overeat, such as eating while watching television  Find ways to avoid these situations  For example, read a book, go for a walk, or do crafts  · Meet with a weight loss support group or friends who are also trying to lose weight  This may help you stay motivated to continue working on your weight loss goals      © Copyright [employer] 2022 Information is for End User's use only and may not be sold, redistributed or otherwise used for commercial purposes  All illustrations and images included in CareNotes® are the copyrighted property of [employer]  or [employer]  The above information is an  only  It is not intended as medical advice for individual conditions or treatments  Talk to your doctor, nurse or pharmacist before following any medical regimen to see if it is safe and effective for you  Cholesterol and Your Health   AMBULATORY CARE:   Cholesterol  is a waxy, fat-like substance  Your body uses cholesterol to make hormones and new cells, and to protect nerves  Cholesterol is made by your body  It also comes from certain foods you eat, such as meat and dairy products  Your healthcare provider can help you set goals for your cholesterol levels  He or she can help you create a plan to meet your goals  Cholesterol level goals: Your cholesterol level goals depend on your risk for heart disease, your age, and your other health conditions  The following are general guidelines:  · Total cholesterol  includes low-density lipoprotein (LDL), high-density lipoprotein (HDL), and triglyceride levels  The total cholesterol level should be lower than 200 mg/dL and is best at about 150 mg/dL  · LDL cholesterol  is called bad cholesterol  because it forms plaque in your arteries  As plaque builds up, your arteries become narrow, and less blood flows through  When plaque decreases blood flow to your heart, you may have chest pain  If plaque completely blocks an artery that brings blood to your heart, you may have a heart attack  Plaque can break off and form blood clots  Blood clots may block arteries in your brain and cause a stroke  The level should be less than 130 mg/dL and is best at about 100 mg/dL  · HDL cholesterol  is called good cholesterol  because it helps remove LDL cholesterol from your arteries   It does this by attaching to LDL cholesterol and carrying it to your liver  Your liver breaks down LDL cholesterol so your body can get rid of it  High levels of HDL cholesterol can help prevent a heart attack and stroke  Low levels of HDL cholesterol can increase your risk for heart disease, heart attack, and stroke  The level should be 60 mg/dL or higher  · Triglycerides  are a type of fat that store energy from foods you eat  High levels of triglycerides also cause plaque buildup  This can increase your risk for a heart attack or stroke  If your triglyceride level is high, your LDL cholesterol level may also be high  The level should be less than 150 mg/dL  Any of the following can increase your risk for high cholesterol:   · Smoking cigarettes    · Being overweight or obese, or not getting enough exercise    · Drinking large amounts of alcohol    · A medical condition such as hypertension (high blood pressure) or diabetes    · Certain genes passed from your parents to you    · Age older than 65 years    What you need to know about having your cholesterol levels checked: Adults 21to 39years of age should have their cholesterol levels checked every 4 to 6 years  Adults 45 years or older should have their cholesterol checked every 1 to 2 years  You may need your cholesterol checked more often, or at a younger age, if you have risk factors for heart disease  You may also need to have your cholesterol checked more often if you have other health conditions, such as diabetes  Blood tests are used to check cholesterol levels  Blood tests measure your levels of triglycerides, LDL cholesterol, and HDL cholesterol  How healthy fats affect your cholesterol levels:  Healthy fats, also called unsaturated fats, help lower LDL cholesterol and triglyceride levels  Healthy fats include the following:  · Monounsaturated fats  are found in foods such as olive oil, canola oil, avocado, nuts, and olives      · Polyunsaturated fats,  such as omega 3 fats, are found in fish, such as salmon, trout, and tuna  They can also be found in plant foods such as flaxseed, walnuts, and soybeans  How unhealthy fats affect your cholesterol levels:  Unhealthy fats increase LDL cholesterol and triglyceride levels  They are found in foods high in cholesterol, saturated fat, and trans fat:  · Cholesterol  is found in eggs, dairy, and meat  · Saturated fat  is found in butter, cheese, ice cream, whole milk, and coconut oil  Saturated fat is also found in meat, such as sausage, hot dogs, and bologna  · Trans fat  is found in liquid oils and is used in fried and baked foods  Foods that contain trans fats include chips, crackers, muffins, sweet rolls, microwave popcorn, and cookies  Treatment  for high cholesterol will also decrease your risk of heart disease, heart attack, and stroke  Treatment may include any of the following:  · Lifestyle changes  may include food, exercise, weight loss, and quitting smoking  You may also need to decrease the amount of alcohol you drink  Your healthcare provider will want you to start with lifestyle changes  Other treatment may be added if lifestyle changes are not enough  Your healthcare provider may recommend you work with a team to manage hyperlipidemia  The team may include medical experts such as a dietitian, an exercise or physical therapist, and a behavior therapist  Your family members may be included in helping you create lifestyle changes  · Medicines  may be given to lower your LDL cholesterol, triglyceride levels, or total cholesterol level  You may need medicines to lower your cholesterol if any of the following is true:    ? You have a history of stroke, TIA, unstable angina, or a heart attack  ? Your LDL cholesterol level is 190 mg/dL or higher  ? You are age 36 to 76 years, have diabetes or heart disease risk factors, and your LDL cholesterol is 70 mg/dL or higher      · Supplements  include fish oil, red yeast rice, and garlic  Fish oil may help lower your triglyceride and LDL cholesterol levels  It may also increase your HDL cholesterol level  Red yeast rice may help decrease your total cholesterol level and LDL cholesterol level  Garlic may help lower your total cholesterol level  Do not take any supplements without talking to your healthcare provider  Food changes you can make to lower your cholesterol levels:  A dietitian can help you create a healthy eating plan  He or she can show you how to read food labels and choose foods low in saturated fat, trans fats, and cholesterol  · Decrease the total amount of fat you eat  Choose lean meats, fat-free or 1% fat milk, and low-fat dairy products, such as yogurt and cheese  Try to limit or avoid red meats  Limit or do not eat fried foods or baked goods, such as cookies  · Replace unhealthy fats with healthy fats  Cook foods in olive oil or canola oil  Choose soft margarines that are low in saturated fat and trans fat  Seeds, nuts, and avocados are other examples of healthy fats  · Eat foods with omega-3 fats  Examples include salmon, tuna, mackerel, walnuts, and flaxseed  Eat fish 2 times per week  Pregnant women should not eat fish that have high levels of mercury, such as shark, swordfish, and mike mackerel  · Increase the amount of high-fiber foods you eat  High-fiber foods can help lower your LDL cholesterol  Aim to get between 20 and 30 grams of fiber each day  Fruits and vegetables are high in fiber  Eat at least 5 servings each day  Other high-fiber foods are whole-grain or whole-wheat breads, pastas, or cereals, and brown rice  Eat 3 ounces of whole-grain foods each day  Increase fiber slowly  You may have abdominal discomfort, bloating, and gas if you add fiber to your diet too quickly  · Eat healthy protein foods  Examples include low-fat dairy products, skinless chicken and turkey, fish, and nuts      · Limit foods and drinks that are high in sugar  Your dietitian or healthcare provider can help you create daily limits for high-sugar foods and drinks  The limit may be lower if you have diabetes or another health condition  Limits can also help you lose weight if needed  Lifestyle changes you can make to lower your cholesterol levels:   · Maintain a healthy weight  Ask your healthcare provider what a healthy weight is for you  Ask him or her to help you create a weight loss plan if needed  Weight loss can decrease your total cholesterol and triglyceride levels  Weight loss may also help keep your blood pressure at a healthy level  · Be physically active throughout the day  Physical activity, such as exercise, can help lower your total cholesterol level and maintain a healthy weight  Physical activity can also help increase your HDL cholesterol level  Work with your healthcare provider to create an program that is right for you  Get at least 30 to 40 minutes of moderate physical activity most days of the week  Examples of exercise include brisk walking, swimming, or biking  Also include strength training at least 2 times each week  Your healthcare providers can help you create a physical activity plan  · Do not smoke  Nicotine and other chemicals in cigarettes and cigars can raise your cholesterol levels  Ask your healthcare provider for information if you currently smoke and need help to quit  E-cigarettes or smokeless tobacco still contain nicotine  Talk to your healthcare provider before you use these products  · Limit or do not drink alcohol  Alcohol can increase your triglyceride levels  Ask your healthcare provider before you drink alcohol  Ask how much is okay for you to drink in 24 hours or 1 week  Follow up with your doctor as directed:  Write down your questions so you remember to ask them during your visits    © Copyright [employer] 2022 Information is for End User's use only and may not be sold, redistributed or otherwise used for commercial purposes  All illustrations and images included in CareNotes® are the copyrighted property of [employer]  or [employer]  The above information is an  only  It is not intended as medical advice for individual conditions or treatments  Talk to your doctor, nurse or pharmacist before following any medical regimen to see if it is safe and effective for you

## 2022-04-12 ENCOUNTER — CONSULT (OUTPATIENT)
Dept: PULMONOLOGY | Facility: MEDICAL CENTER | Age: 57
End: 2022-04-12
Payer: COMMERCIAL

## 2022-04-12 VITALS
WEIGHT: 211 LBS | RESPIRATION RATE: 12 BRPM | SYSTOLIC BLOOD PRESSURE: 142 MMHG | BODY MASS INDEX: 31.98 KG/M2 | TEMPERATURE: 98.4 F | HEIGHT: 68 IN | OXYGEN SATURATION: 98 % | HEART RATE: 92 BPM | DIASTOLIC BLOOD PRESSURE: 96 MMHG

## 2022-04-12 DIAGNOSIS — R53.83 OTHER FATIGUE: ICD-10-CM

## 2022-04-12 DIAGNOSIS — E66.09 CLASS 1 OBESITY DUE TO EXCESS CALORIES WITHOUT SERIOUS COMORBIDITY WITH BODY MASS INDEX (BMI) OF 32.0 TO 32.9 IN ADULT: ICD-10-CM

## 2022-04-12 DIAGNOSIS — R06.83 SNORING: Primary | ICD-10-CM

## 2022-04-12 PROCEDURE — 3008F BODY MASS INDEX DOCD: CPT | Performed by: INTERNAL MEDICINE

## 2022-04-12 PROCEDURE — 99243 OFF/OP CNSLTJ NEW/EST LOW 30: CPT | Performed by: INTERNAL MEDICINE

## 2022-04-12 PROCEDURE — 1036F TOBACCO NON-USER: CPT | Performed by: INTERNAL MEDICINE

## 2022-04-12 NOTE — ASSESSMENT & PLAN NOTE
Remedios James does have daytime fatigue and loud snoring at night  He is overweight  Possibly could have obstructive sleep apnea  I discussed the diagnosis and treatment of SHELLEY with him  I did order home sleep study and he was agreeable to this    He will call for results of test after this is done

## 2022-04-12 NOTE — PROGRESS NOTES
Assessment/Plan:       Problem List Items Addressed This Visit        Other    Other fatigue    Relevant Orders    Home Study    Snoring - Primary     Scot does have daytime fatigue and loud snoring at night  He is overweight  Possibly could have obstructive sleep apnea  I discussed the diagnosis and treatment of SHELLEY with him  I did order home sleep study and he was agreeable to this  He will call for results of test after this is done         Relevant Orders    Home Study    Class 1 obesity due to excess calories without serious comorbidity with body mass index (BMI) of 32 0 to 32 9 in adult     Did recommend he try to keep his weight normal                  Plan for follow up:    No follow-ups on file  All questions are answered to the patient's satisfaction and understanding  He verbalizes understanding  He is encouraged to call with any further questions or concerns  Portions of the record may have been created with voice recognition software  Occasional wrong word or "sound a like" substitutions may have occurred due to the inherent limitations of voice recognition software  Read the chart carefully and recognize, using context, where substitutions have occurred  a    Electronically Signed by Aguilar Palomo,     ______________________________________________________________________    Chief Complaint:  Here for evaluation of possible sleep disorder breathing       Patient ID: Vitaliy Steele is a 56 y o  y o  male has a past medical history of Closed fracture of thoracic vertebral body ([facility] [address] ) (2014)  4/12/2022  Patient presents today for initial visit for possible sleep disorder breathing    HPI     Trung Santos is 56years old and has history of hypertension, hyperlipidemia and anxiety  He also has history of varicose veins of both lower extremities and has been evaluated by vascular surgeon Dr Francisco Villanueva for this  He does snore loudly at times    He does have a girlfriend and she often no gym at nighttime because of his loud snoring  Sometimes she will leave room because of snoring  She has witnessed him having some apneas and groaning noises  He does try to sleep anywhere from 6-8 hours per night  He does feel tired during the day  Does not feel like he will fall asleep driving  No morning headaches  Does not have shortness of breath with activity  Does get periodic gastric reflux  States that he is borderline hypertensive  He wants was given lisinopril but then because they thought it was white coat hypertension is monitor his blood pressure at home without medication and was satisfactory  He is not on lisinopril anymore  No recent weight changes  Has not had any prior surgeries    He is a nonsmoker    Occupational/Exposure history:  Self employed residential contractor      Review of Systems   Constitutional: Negative for chills, fever and unexpected weight change  HENT: Negative for congestion, rhinorrhea and sore throat  Eyes: Negative for discharge and redness  Respiratory: Negative for shortness of breath and wheezing  Cardiovascular: Negative for chest pain, palpitations and leg swelling  Gastrointestinal: Negative for abdominal distention, abdominal pain and nausea  Does get periodic gastric reflux   Endocrine: Negative for polydipsia and polyphagia  Genitourinary: Negative for dysuria  Musculoskeletal: Negative for joint swelling  Skin: Negative for rash  Neurological: Negative for light-headedness  Psychiatric/Behavioral: Negative for confusion  Social history: He reports that he has never smoked  He has never used smokeless tobacco  He reports current alcohol use  He reports that he does not use drugs      Past surgical history:   Past Surgical History:   Procedure Laterality Date    NO PAST SURGERIES       Family history:   Family History   Problem Relation Age of Onset    Ovarian cancer Family     Prostate cancer Family     Ovarian cancer Mother There is no immunization history on file for this patient  Current Outpatient Medications   Medication Sig Dispense Refill    vardenafil (LEVITRA) 20 MG tablet Take 1 tablet (20 mg total) by mouth daily as needed for erectile dysfunction 4 tablet 10    venlafaxine (EFFEXOR-XR) 37 5 mg 24 hr capsule Take 1 capsule (37 5 mg total) by mouth daily 90 capsule 3    lisinopril (ZESTRIL) 10 mg tablet Take 1 tablet (10 mg total) by mouth daily (Patient not taking: Reported on 2/26/2021) 90 tablet 3     No current facility-administered medications for this visit  Allergies: Patient has no known allergies  Objective:  Vitals:    04/12/22 1315   BP: 142/96   Pulse: 92   Resp: 12   Temp: 98 4 °F (36 9 °C)   TempSrc: Temporal   SpO2: 98%   Weight: 95 7 kg (211 lb)   Height: 5' 8" (1 727 m)   Oxygen Therapy  SpO2: 98 %    Wt Readings from Last 3 Encounters:   04/12/22 95 7 kg (211 lb)   02/28/22 97 2 kg (214 lb 3 2 oz)   02/26/21 94 3 kg (208 lb)     Body mass index is 32 08 kg/m²  Physical Exam  Vitals reviewed  Constitutional:       General: He is not in acute distress  Appearance: Normal appearance  He is well-developed  HENT:      Head: Normocephalic  Right Ear: External ear normal       Left Ear: External ear normal       Nose: Nose normal       Mouth/Throat:      Mouth: Mucous membranes are moist       Pharynx: Oropharynx is clear  No oropharyngeal exudate  Comments: Mallampati score is 1  There is mild narrowing of oropharyngeal airway  Eyes:      Conjunctiva/sclera: Conjunctivae normal       Pupils: Pupils are equal, round, and reactive to light  Neck:      Vascular: No JVD  Trachea: No tracheal deviation  Cardiovascular:      Rate and Rhythm: Normal rate and regular rhythm  Heart sounds: Normal heart sounds     Pulmonary:      Effort: Pulmonary effort is normal       Comments: Lung sounds are clear no wheezes, crackles,  or rhonchi  Abdominal:      General: There is no distension  Palpations: Abdomen is soft  Tenderness: There is no abdominal tenderness  There is no guarding  Musculoskeletal:      Cervical back: Neck supple  Comments: No edema, cyanosis or clubbing   Lymphadenopathy:      Cervical: No cervical adenopathy  Skin:     General: Skin is warm and dry  Findings: No rash  Neurological:      Mental Status: He is alert and oriented to person, place, and time  Psychiatric:         Behavior: Behavior normal          Thought Content:  Thought content normal          Lab Review:   Lab Results   Component Value Date    K 4 0 01/27/2020     01/27/2020    CO2 20 01/27/2020    BUN 14 01/27/2020    CREATININE 0 90 01/27/2020     No results found for: WBC, HGB, HCT, MCV, PLT  Lab Results   Component Value Date    TRIG 65 01/27/2020    HDL 78 01/27/2020     Serum cholesterol was 234 on 01/27/2020    ESS: Total score: 5, neck size  17 inches

## 2022-05-12 ENCOUNTER — TELEPHONE (OUTPATIENT)
Dept: GASTROENTEROLOGY | Facility: CLINIC | Age: 57
End: 2022-05-12

## 2022-05-12 NOTE — TELEPHONE ENCOUNTER
Scheduled date of colonoscopy (as of today):07 21 22  Physician performing colonoscopy:DR KENYON  Location of colonoscopy:[facility]  Bowel prep reviewed with patient:DULCOLAX/MIRALAX  Instructions reviewed with patient by:ASHLIE VERBALLY/MAILED  Clearances:N/A    05/12/22  Screened by: Fiordaliza Delong    Referring Provider Kassie Deras    Pre- Screening: Body mass index is 32 08 kg/m²  Has patient been referred for a routine screening Colonoscopy? yes  Is the patient between 39-70 years old? yes      Previous Colonoscopy yes   If yes:    Date: [address]:     Reason:       SCHEDULING STAFF: If the patient is between 45yrs-49yrs, please advise patient to confirm benefits/coverage with their insurance company for a routine screening colonoscopy, some insurance carriers will only cover at [address] or older  If the patient is over 66years old, please schedule an office visit  Does the patient want to see a Gastroenterologist prior to their procedure OR are they having any GI symptoms? no    Has the patient been hospitalized or had abdominal surgery in the past 6 months? no    Does the patient use supplemental oxygen? no    Does the patient take Coumadin, Lovenox, Plavix, Elliquis, Xarelto, or other blood thinning medication? no    Has the patient had a stroke, cardiac event, or stent placed in the past year? no    SCHEDULING STAFF: If patient answers NO to above questions, then schedule procedure  If patient answers YES to above questions, then schedule office appointment  If patient is between 45yrs - 49yrs, please advise patient that we will have to confirm benefits & coverage with their insurance company for a routine screening colonoscopy

## 2022-06-27 DIAGNOSIS — Z12.5 SCREENING FOR PROSTATE CANCER: Primary | ICD-10-CM

## 2022-06-28 LAB
ALBUMIN SERPL-MCNC: 4.7 G/DL (ref 3.8–4.9)
ALBUMIN/GLOB SERPL: 1.6 {RATIO} (ref 1.2–2.2)
ALP SERPL-CCNC: 71 IU/L (ref 44–121)
ALT SERPL-CCNC: 72 IU/L (ref 0–44)
AST SERPL-CCNC: 58 IU/L (ref 0–40)
BILIRUB SERPL-MCNC: 0.4 MG/DL (ref 0–1.2)
BUN SERPL-MCNC: 13 MG/DL (ref 6–24)
BUN/CREAT SERPL: 13 (ref 9–20)
CALCIUM SERPL-MCNC: 9.8 MG/DL (ref 8.7–10.2)
CHLORIDE SERPL-SCNC: 100 MMOL/L (ref 96–106)
CHOLEST SERPL-MCNC: 288 MG/DL (ref 100–199)
CO2 SERPL-SCNC: 23 MMOL/L (ref 20–29)
CREAT SERPL-MCNC: 0.97 MG/DL (ref 0.76–1.27)
EGFR: 92 ML/MIN/1.73
GLOBULIN SER-MCNC: 2.9 G/DL (ref 1.5–4.5)
GLUCOSE SERPL-MCNC: 91 MG/DL (ref 65–99)
HBA1C MFR BLD: 5.5 % (ref 4.8–5.6)
HDLC SERPL-MCNC: 77 MG/DL
LDLC SERPL CALC-MCNC: 181 MG/DL (ref 0–99)
POTASSIUM SERPL-SCNC: 4.3 MMOL/L (ref 3.5–5.2)
PROT SERPL-MCNC: 7.6 G/DL (ref 6–8.5)
SL AMB VLDL CHOLESTEROL CALC: 30 MG/DL (ref 5–40)
SODIUM SERPL-SCNC: 138 MMOL/L (ref 134–144)
TRIGL SERPL-MCNC: 166 MG/DL (ref 0–149)

## 2022-07-13 ENCOUNTER — TELEPHONE (OUTPATIENT)
Dept: PREADMISSION TESTING | Facility: HOSPITAL | Age: 57
End: 2022-07-13

## 2022-07-13 VITALS — HEIGHT: 68 IN | WEIGHT: 202 LBS | BODY MASS INDEX: 30.62 KG/M2

## 2022-07-13 NOTE — PRE-PROCEDURE INSTRUCTIONS
Pre-Surgery Instructions:   Medication Instructions    bisacodyl (DULCOLAX) 5 mg EC tablet Instructions provided by MD    lisinopril (ZESTRIL) 10 mg tablet Take night before surgery    Polyethylene Glycol 3350 (MIRALAX PO) Instructions provided by MD    vardenafil (LEVITRA) 20 MG tablet Hold day of surgery   venlafaxine (EFFEXOR-XR) 37 5 mg 24 hr capsule Take day of surgery  Pt to follow Dr Kevin Dotson instructions  Pt to take venlafaxine the am of the procedure, at least 2 hours prior to arrival time, with up to 6 oz of water  Pt to arrive to the [facility] at the given time, wear a mask    Ashley Vasquez [phone]

## 2022-07-16 DIAGNOSIS — Z20.822 ENCOUNTER FOR LABORATORY TESTING FOR COVID-19 VIRUS: ICD-10-CM

## 2022-07-16 PROCEDURE — U0003 INFECTIOUS AGENT DETECTION BY NUCLEIC ACID (DNA OR RNA); SEVERE ACUTE RESPIRATORY SYNDROME CORONAVIRUS 2 (SARS-COV-2) (CORONAVIRUS DISEASE [COVID-19]), AMPLIFIED PROBE TECHNIQUE, MAKING USE OF HIGH THROUGHPUT TECHNOLOGIES AS DESCRIBED BY CMS-2020-01-R: HCPCS

## 2022-07-16 PROCEDURE — U0005 INFEC AGEN DETEC AMPLI PROBE: HCPCS

## 2022-07-17 LAB — SARS-COV-2 RNA RESP QL NAA+PROBE: NEGATIVE

## 2022-07-18 ENCOUNTER — OFFICE VISIT (OUTPATIENT)
Dept: FAMILY MEDICINE CLINIC | Facility: CLINIC | Age: 57
End: 2022-07-18
Payer: COMMERCIAL

## 2022-07-18 VITALS
DIASTOLIC BLOOD PRESSURE: 84 MMHG | RESPIRATION RATE: 16 BRPM | TEMPERATURE: 98.4 F | BODY MASS INDEX: 31.22 KG/M2 | HEART RATE: 110 BPM | SYSTOLIC BLOOD PRESSURE: 146 MMHG | HEIGHT: 68 IN | OXYGEN SATURATION: 99 % | WEIGHT: 206 LBS

## 2022-07-18 DIAGNOSIS — M54.42 ACUTE BILATERAL LOW BACK PAIN WITH BILATERAL SCIATICA: Primary | ICD-10-CM

## 2022-07-18 DIAGNOSIS — M54.41 ACUTE BILATERAL LOW BACK PAIN WITH BILATERAL SCIATICA: Primary | ICD-10-CM

## 2022-07-18 PROCEDURE — 99213 OFFICE O/P EST LOW 20 MIN: CPT | Performed by: FAMILY MEDICINE

## 2022-07-18 RX ORDER — CYCLOBENZAPRINE HCL 10 MG
10 TABLET ORAL 3 TIMES DAILY PRN
Qty: 42 TABLET | Refills: 0 | Status: SHIPPED | OUTPATIENT
Start: 2022-07-18

## 2022-07-18 RX ORDER — NAPROXEN 500 MG/1
500 TABLET ORAL 2 TIMES DAILY WITH MEALS
Qty: 28 TABLET | Refills: 0 | Status: SHIPPED | OUTPATIENT
Start: 2022-07-18 | End: 2022-08-26

## 2022-07-20 NOTE — TELEPHONE ENCOUNTER
Patient injured his back and cancelled tomorrow's OA colonoscopy  Rescheduled to 08/31/22 with Dr Drew Littlejohn   Patient aware he will need another COVID test

## 2022-07-27 NOTE — PROGRESS NOTES
[id number] [address] [facility] Note  [address]out MD, 22     Sanya Jarrell MRN: [id number] : 1965 Age: 56 y o  Assessment/Plan     Radha Michaels was seen today for back pain  Diagnoses and all orders for this visit:    Acute bilateral low back pain with bilateral sciatica  -     cyclobenzaprine (FLEXERIL) 10 mg tablet; Take 1 tablet (10 mg total) by mouth 3 (three) times a day as needed for muscle spasms  -     naproxen (EC NAPROSYN) 500 MG EC tablet; Take 1 tablet (500 mg total) by mouth 2 (two) times a day with meals  -     Ambulatory Referral to Physical Therapy; Future      Sanya Jarrell acknowledged understanding of treatment plan, all questions answered  Reminded of office on-call physician availability  for any concerns  Plan discussed with attending physician Dr Karen Joseph  No follow-ups on file  Subjective      Sanya Jarrell is a 56 y o  male  Chief Complaint   Patient presents with    Back Pain     Non-radiating low back pain after moving a cooler on Saturday, worse with standing, walking, driving  Scheduled for colonoscopy on Thursday, concerned he may have to cancel due to back pain  Back Pain  This is a new problem  Episode onset: Saturday afternoon after lifting a cooler with drinks inside  The problem occurs intermittently  The quality of the pain is described as shooting and aching  The pain is moderate  Pertinent negatives include no abdominal pain, chest pain, dysuria, fever, leg pain, tingling or weakness  He has tried heat for the symptoms  He has colonoscopy scheduled for Thursday, and due to the acute back pain, he was wary about being able to make it to the bathroom on time while on the bowel prep        The following portions of the patient's history were reviewed and updated as appropriate: allergies, current medications, past family history, past medical history, past social history, past surgical history and problem list     Review of Systems Constitutional: Negative for chills and fever  HENT: Negative for ear pain and sore throat  Eyes: Negative for pain and visual disturbance  Respiratory: Negative for cough and shortness of breath  Cardiovascular: Negative for chest pain and palpitations  Gastrointestinal: Negative for abdominal pain and vomiting  Genitourinary: Negative for dysuria and hematuria  Musculoskeletal: Positive for back pain  Negative for arthralgias  Skin: Negative for color change and rash  Neurological: Negative for tingling, seizures, syncope and weakness  All other systems reviewed and are negative  Past Medical History:   Diagnosis Date    Closed fracture of thoracic vertebral body ([facility] [address] ) 2014    Erectile dysfunction     Hypertension     Snores     Varicose vein of leg        Current Outpatient Medications   Medication Sig Dispense Refill    cyclobenzaprine (FLEXERIL) 10 mg tablet Take 1 tablet (10 mg total) by mouth 3 (three) times a day as needed for muscle spasms 42 tablet 0    lisinopril (ZESTRIL) 10 mg tablet Take 1 tablet (10 mg total) by mouth daily (Patient taking differently: Take 10 mg by mouth daily at bedtime) 90 tablet 3    naproxen (EC NAPROSYN) 500 MG EC tablet Take 1 tablet (500 mg total) by mouth 2 (two) times a day with meals 28 tablet 0    vardenafil (LEVITRA) 20 MG tablet Take 1 tablet (20 mg total) by mouth daily as needed for erectile dysfunction 4 tablet 10    venlafaxine (EFFEXOR-XR) 37 5 mg 24 hr capsule Take 1 capsule (37 5 mg total) by mouth daily (Patient taking differently: Take 37 5 mg by mouth every morning) 90 capsule 3    bisacodyl (DULCOLAX) 5 mg EC tablet Take 10 mg by mouth once (Patient not taking: Reported on 7/18/2022)      Polyethylene Glycol 3350 (MIRALAX PO) Take by mouth once 238 gm (Patient not taking: Reported on 7/18/2022)       No current facility-administered medications for this visit       Objective      /84 (BP Location: Left arm, Patient Position: Sitting, Cuff Size: Adult)   Pulse (!) 110   Temp 98 4 °F (36 9 °C) (Tympanic)   Resp 16   Ht 5' 8" (1 727 m)   Wt 93 4 kg (206 lb)   SpO2 99%   BMI 31 32 kg/m²     Physical Exam  Constitutional:       General: He is not in acute distress  Appearance: Normal appearance  HENT:      Head: Normocephalic and atraumatic  Eyes:      Pupils: Pupils are equal, round, and reactive to light  Cardiovascular:      Rate and Rhythm: Normal rate and regular rhythm  Pulses: Normal pulses  Heart sounds: Normal heart sounds  No murmur heard  Pulmonary:      Effort: Pulmonary effort is normal  No respiratory distress  Breath sounds: Normal breath sounds  No wheezing  Abdominal:      General: Bowel sounds are normal       Palpations: Abdomen is soft  Musculoskeletal:      Comments: Paraspinal tenderness of the lower back bilaterally  Pos straight leg test bilaterally  Skin:     General: Skin is warm and dry  Neurological:      General: No focal deficit present  Mental Status: He is alert and oriented to person, place, and time  Psychiatric:         Mood and Affect: Mood normal          Behavior: Behavior normal            Some portions of this record may have been generated with voice recognition software  There may be translation, syntax, or grammatical errors  Occasional wrong word or "sound-a-like" substitutions may have occurred due to the inherent limitations of the voice recognition software  Read the chart carefully and recognize, using context, where substations may have occurred  If you have any questions, please contact the dictating provider for clarification or correction, as needed

## 2022-08-12 ENCOUNTER — HOSPITAL ENCOUNTER (OUTPATIENT)
Dept: SLEEP CENTER | Facility: CLINIC | Age: 57
Discharge: HOME | End: 2022-08-12
Payer: COMMERCIAL

## 2022-08-12 DIAGNOSIS — R06.83 SNORING: ICD-10-CM

## 2022-08-12 DIAGNOSIS — R53.83 OTHER FATIGUE: ICD-10-CM

## 2022-08-12 PROCEDURE — G0399 HOME SLEEP TEST/TYPE 3 PORTA: HCPCS

## 2022-08-13 NOTE — PROGRESS NOTES
Home Sleep Study Documentation    HOME STUDY DEVICE: Noxturnal yes                                           Devika G3 no      Pre-Sleep Home Study:    Set-up and instructions performed by: ROBER Nation    Technician performed demonstration for Patient: yes    Return demonstration performed by Patient: yes    Written instructions provided to Patient: yes    Patient signed consent form: yes        Post-Sleep Home Study:    Additional comments by Patient: none  Home Sleep Study Failed:no:    Failure reason: N/A    Reported or Detected: N/A    Scored by: WEN Greco RPSGT

## 2022-08-16 DIAGNOSIS — G47.33 OSA (OBSTRUCTIVE SLEEP APNEA): Primary | ICD-10-CM

## 2022-08-16 PROCEDURE — 95806 SLEEP STUDY UNATT&RESP EFFT: CPT | Performed by: INTERNAL MEDICINE

## 2022-08-23 ENCOUNTER — TELEPHONE (OUTPATIENT)
Dept: SLEEP CENTER | Facility: CLINIC | Age: 57
End: 2022-08-23

## 2022-08-23 NOTE — TELEPHONE ENCOUNTER
Patient of Dr Radhika Barajas at [place] [address] the patient Sleep study shows severe SHELLEY    AHI 30 1 and Dr Yasmeen Mata ordered CPAP study     Scheduled the patient for CPAP study

## 2022-08-25 DIAGNOSIS — Z11.59 SCREENING FOR VIRAL DISEASE: ICD-10-CM

## 2022-08-25 PROCEDURE — U0005 INFEC AGEN DETEC AMPLI PROBE: HCPCS | Performed by: INTERNAL MEDICINE

## 2022-08-25 PROCEDURE — U0003 INFECTIOUS AGENT DETECTION BY NUCLEIC ACID (DNA OR RNA); SEVERE ACUTE RESPIRATORY SYNDROME CORONAVIRUS 2 (SARS-COV-2) (CORONAVIRUS DISEASE [COVID-19]), AMPLIFIED PROBE TECHNIQUE, MAKING USE OF HIGH THROUGHPUT TECHNOLOGIES AS DESCRIBED BY CMS-2020-01-R: HCPCS | Performed by: INTERNAL MEDICINE

## 2022-08-26 LAB — SARS-COV-2 RNA RESP QL NAA+PROBE: NEGATIVE

## 2022-08-31 ENCOUNTER — ANESTHESIA (OUTPATIENT)
Dept: GASTROENTEROLOGY | Facility: AMBULARY SURGERY CENTER | Age: 57
End: 2022-08-31

## 2022-08-31 ENCOUNTER — HOSPITAL ENCOUNTER (OUTPATIENT)
Dept: GASTROENTEROLOGY | Facility: AMBULARY SURGERY CENTER | Age: 57
Setting detail: OUTPATIENT SURGERY
Discharge: HOME | End: 2022-08-31
Attending: INTERNAL MEDICINE
Payer: COMMERCIAL

## 2022-08-31 ENCOUNTER — ANESTHESIA EVENT (OUTPATIENT)
Dept: GASTROENTEROLOGY | Facility: AMBULARY SURGERY CENTER | Age: 57
End: 2022-08-31

## 2022-08-31 VITALS
RESPIRATION RATE: 15 BRPM | WEIGHT: 202 LBS | HEIGHT: 68 IN | TEMPERATURE: 97.5 F | SYSTOLIC BLOOD PRESSURE: 133 MMHG | HEART RATE: 78 BPM | DIASTOLIC BLOOD PRESSURE: 84 MMHG | OXYGEN SATURATION: 97 % | BODY MASS INDEX: 30.62 KG/M2

## 2022-08-31 DIAGNOSIS — Z12.11 SCREEN FOR COLON CANCER: ICD-10-CM

## 2022-08-31 DIAGNOSIS — Z86.010 HX OF COLONIC POLYP: ICD-10-CM

## 2022-08-31 PROBLEM — I10 HTN (HYPERTENSION): Status: ACTIVE | Noted: 2022-08-31

## 2022-08-31 PROCEDURE — 88305 TISSUE EXAM BY PATHOLOGIST: CPT | Performed by: PATHOLOGY

## 2022-08-31 PROCEDURE — 45385 COLONOSCOPY W/LESION REMOVAL: CPT | Performed by: INTERNAL MEDICINE

## 2022-08-31 PROCEDURE — 45380 COLONOSCOPY AND BIOPSY: CPT | Performed by: INTERNAL MEDICINE

## 2022-08-31 RX ORDER — SODIUM CHLORIDE, SODIUM LACTATE, POTASSIUM CHLORIDE, CALCIUM CHLORIDE 600; 310; 30; 20 MG/100ML; MG/100ML; MG/100ML; MG/100ML
INJECTION, SOLUTION INTRAVENOUS CONTINUOUS PRN
Status: DISCONTINUED | OUTPATIENT
Start: 2022-08-31 | End: 2022-08-31

## 2022-08-31 RX ORDER — SODIUM CHLORIDE, SODIUM LACTATE, POTASSIUM CHLORIDE, CALCIUM CHLORIDE 600; 310; 30; 20 MG/100ML; MG/100ML; MG/100ML; MG/100ML
100 INJECTION, SOLUTION INTRAVENOUS CONTINUOUS
Status: DISCONTINUED | OUTPATIENT
Start: 2022-08-31 | End: 2022-09-04 | Stop reason: HOSPADM

## 2022-08-31 RX ORDER — LIDOCAINE HYDROCHLORIDE 10 MG/ML
INJECTION, SOLUTION EPIDURAL; INFILTRATION; INTRACAUDAL; PERINEURAL AS NEEDED
Status: DISCONTINUED | OUTPATIENT
Start: 2022-08-31 | End: 2022-08-31

## 2022-08-31 RX ORDER — PROPOFOL 10 MG/ML
INJECTION, EMULSION INTRAVENOUS AS NEEDED
Status: DISCONTINUED | OUTPATIENT
Start: 2022-08-31 | End: 2022-08-31

## 2022-08-31 RX ORDER — SODIUM CHLORIDE, SODIUM LACTATE, POTASSIUM CHLORIDE, CALCIUM CHLORIDE 600; 310; 30; 20 MG/100ML; MG/100ML; MG/100ML; MG/100ML
100 INJECTION, SOLUTION INTRAVENOUS CONTINUOUS
Status: CANCELLED | OUTPATIENT
Start: 2022-08-31

## 2022-08-31 RX ADMIN — LIDOCAINE HYDROCHLORIDE 50 MG: 10 INJECTION, SOLUTION EPIDURAL; INFILTRATION; INTRACAUDAL at 11:18

## 2022-08-31 RX ADMIN — SODIUM CHLORIDE, SODIUM LACTATE, POTASSIUM CHLORIDE, AND CALCIUM CHLORIDE 100 ML/HR: .6; .31; .03; .02 INJECTION, SOLUTION INTRAVENOUS at 10:44

## 2022-08-31 RX ADMIN — SODIUM CHLORIDE, SODIUM LACTATE, POTASSIUM CHLORIDE, AND CALCIUM CHLORIDE: .6; .31; .03; .02 INJECTION, SOLUTION INTRAVENOUS at 11:14

## 2022-08-31 RX ADMIN — PROPOFOL 40 MG: 10 INJECTION, EMULSION INTRAVENOUS at 11:25

## 2022-08-31 RX ADMIN — PROPOFOL 50 MG: 10 INJECTION, EMULSION INTRAVENOUS at 11:19

## 2022-08-31 RX ADMIN — PROPOFOL 150 MG: 10 INJECTION, EMULSION INTRAVENOUS at 11:18

## 2022-08-31 NOTE — ANESTHESIA PREPROCEDURE EVALUATION
Procedure:  COLONOSCOPY    Relevant Problems   CARDIO   (+) HTN (hypertension)   (+) Mixed hyperlipidemia      NEURO/PSYCH   (+) Anxiety      PULMONARY   (+) SHELLEY (obstructive sleep apnea)        Physical Exam    Airway    Mallampati score: II  TM Distance: >3 FB  Neck ROM: full     Dental       Cardiovascular  Cardiovascular exam normal    Pulmonary  Pulmonary exam normal     Other Findings        Anesthesia Plan  ASA Score- 2     Anesthesia Type- IV sedation with anesthesia with ASA Monitors  Additional Monitors:   Airway Plan:           Plan Factors-Exercise tolerance (METS): >4 METS  Chart reviewed  Patient summary reviewed  Induction- intravenous  Postoperative Plan-     Informed Consent- Anesthetic plan and risks discussed with patient  I personally reviewed this patient with the CRNA  Discussed and agreed on the Anesthesia Plan with the CRNA  Karrie Nissen

## 2022-08-31 NOTE — H&P
History and Physical -  [facility]  Cameron Higgins 56 y o  male MRN: [id number]        HPI:  62-year-old male with history of hypertension colon polyps  Regular bowel movements  Historical Information   Past Medical History:   Diagnosis Date    Closed fracture of thoracic vertebral body ([address] ) 2014    Erectile dysfunction     Hypertension     Snores     Varicose vein of leg      Past Surgical History:   Procedure Laterality Date    COLONOSCOPY  2017    NO PAST SURGERIES       Social History   Social History     Substance and Sexual Activity   Alcohol Use Yes    Comment: socially     Social History     Substance and Sexual Activity   Drug Use Never     Social History     Tobacco Use   Smoking Status Never Smoker   Smokeless Tobacco Never Used     Family History   Problem Relation Age of Onset    Cancer Mother         ovarian    Ovarian cancer Mother     Ovarian cancer Family     Prostate cancer Family        Meds/Allergies     (Not in a hospital admission)      No Known Allergies    Objective     Blood pressure 133/83, pulse 91, temperature 97 5 °F (36 4 °C), temperature source Temporal, resp  rate 18, height 5' 8" (1 727 m), weight 91 6 kg (202 lb), SpO2 100 %      PHYSICAL EXAM:    Gen: NAD  CV: S1 & S2 normal, RRR  CHEST: Clear to auscultate  ABD: soft, NT/ND, good bowel sounds  EXT: no edema    ASSESSMENT:     History of colon polyps    PLAN:    Colonoscopy

## 2022-08-31 NOTE — ANESTHESIA POSTPROCEDURE EVALUATION
Post-Op Assessment Note    CV Status:  Stable  Pain Score: 1    Pain management: adequate     Mental Status:  Alert   PONV Controlled:  None   Airway Patency:  Patent      Post Op Vitals Reviewed: Yes      Staff: Anesthesiologist         No complications documented      BP   128/6   Temp      Pulse  72   Resp      SpO2   96

## 2022-09-05 DIAGNOSIS — R03.0 ELEVATED BLOOD PRESSURE READING: ICD-10-CM

## 2022-09-06 PROCEDURE — 88305 TISSUE EXAM BY PATHOLOGIST: CPT | Performed by: PATHOLOGY

## 2022-09-09 ENCOUNTER — OFFICE VISIT (OUTPATIENT)
Dept: OBGYN CLINIC | Facility: CLINIC | Age: 57
End: 2022-09-09
Payer: COMMERCIAL

## 2022-09-09 ENCOUNTER — APPOINTMENT (OUTPATIENT)
Dept: RADIOLOGY | Facility: CLINIC | Age: 57
End: 2022-09-09
Payer: COMMERCIAL

## 2022-09-09 VITALS
WEIGHT: 208.2 LBS | DIASTOLIC BLOOD PRESSURE: 86 MMHG | HEART RATE: 79 BPM | SYSTOLIC BLOOD PRESSURE: 123 MMHG | BODY MASS INDEX: 31.55 KG/M2 | HEIGHT: 68 IN

## 2022-09-09 DIAGNOSIS — M25.561 RIGHT KNEE PAIN, UNSPECIFIED CHRONICITY: ICD-10-CM

## 2022-09-09 DIAGNOSIS — M17.11 PRIMARY LOCALIZED OSTEOARTHRITIS OF RIGHT KNEE: Primary | ICD-10-CM

## 2022-09-09 DIAGNOSIS — Z01.89 ENCOUNTER FOR LOWER EXTREMITY COMPARISON IMAGING STUDY: ICD-10-CM

## 2022-09-09 PROCEDURE — 73562 X-RAY EXAM OF KNEE 3: CPT

## 2022-09-09 PROCEDURE — 20610 DRAIN/INJ JOINT/BURSA W/O US: CPT | Performed by: ORTHOPAEDIC SURGERY

## 2022-09-09 PROCEDURE — 99204 OFFICE O/P NEW MOD 45 MIN: CPT | Performed by: ORTHOPAEDIC SURGERY

## 2022-09-09 PROCEDURE — 73560 X-RAY EXAM OF KNEE 1 OR 2: CPT

## 2022-09-09 RX ORDER — LIDOCAINE HYDROCHLORIDE 10 MG/ML
4 INJECTION, SOLUTION INFILTRATION; PERINEURAL
Status: COMPLETED | OUTPATIENT
Start: 2022-09-09 | End: 2022-09-09

## 2022-09-09 RX ORDER — DEXAMETHASONE SODIUM PHOSPHATE 100 MG/10ML
40 INJECTION INTRAMUSCULAR; INTRAVENOUS
Status: COMPLETED | OUTPATIENT
Start: 2022-09-09 | End: 2022-09-09

## 2022-09-09 RX ORDER — LISINOPRIL 10 MG/1
10 TABLET ORAL
Qty: 90 TABLET | Refills: 3 | Status: SHIPPED | OUTPATIENT
Start: 2022-09-09

## 2022-09-09 RX ADMIN — LIDOCAINE HYDROCHLORIDE 4 ML: 10 INJECTION, SOLUTION INFILTRATION; PERINEURAL at 09:36

## 2022-09-09 RX ADMIN — DEXAMETHASONE SODIUM PHOSPHATE 40 MG: 100 INJECTION INTRAMUSCULAR; INTRAVENOUS at 09:36

## 2022-10-30 DIAGNOSIS — N52.8 OTHER MALE ERECTILE DYSFUNCTION: ICD-10-CM

## 2022-10-31 RX ORDER — VARDENAFIL HYDROCHLORIDE 20 MG/1
TABLET ORAL
Qty: 4 TABLET | Refills: 10 | Status: SHIPPED | OUTPATIENT
Start: 2022-10-31

## 2022-12-09 ENCOUNTER — HOSPITAL ENCOUNTER (OUTPATIENT)
Dept: SLEEP CENTER | Facility: CLINIC | Age: 57
Discharge: HOME | End: 2022-12-09

## 2022-12-09 DIAGNOSIS — G47.33 OSA (OBSTRUCTIVE SLEEP APNEA): ICD-10-CM

## 2022-12-10 NOTE — PROGRESS NOTES
Sleep Study Documentation    Pre-Sleep Study       Sleep testing procedure explained to patient:YES    Patient napped prior to study:NO    Caffeine:Dayshift worker after 12PM   Caffeine use:YES- soda  12 ounces    Alcohol:Dayshift workers after 5PM: Alcohol use:YES-Beer 1 serving    Typical day for patient:NO       Study Documentation    Sleep Study Indications:   Sleep Study: Treatment   Optimal PAP pressure: 11cm  Leak:Small  Snore:Eliminated  REM Obtained:yes  Supplemental O2: no    Minimum SaO2 86%  Baseline SaO2 93 8%  PAP mask tried (list all)Resmed F20  PAP mask choice (final)Resmed F20  PAP mask type:full face  PAP pressure at which snoring was eliminated 10cm   Minimum SaO2 at final PAP pressure 91%  Mode of Therapy:CPAP  ETCO2:No  CPAP changed to BiPAP:No    Mode of Therapy:CPAP    EKG abnormalities: no     EEG abnormalities: no    Sleep Study Recorded < 2 hours: N/A    Sleep Study Recorded > 2 hours but incomplete study: N/A    Sleep Study Recorded 6 hours but no sleep obtained: NO    Patient classification: employed       Post-Sleep Study    Medication used at bedtime or during sleep study:NO    Patient reports time it took to fall asleep:30 to 60 minutes    Patient reports waking up during study:3 or more times  Patient reports returning to sleep in 10 to 30 minutes  Patient reports sleeping 2 to 4 hours with dreaming  Patient reports sleep during study:typical    Patient rated sleepiness: Somewhat sleepy or tired    PAP treatment:yes: Post PAP treatment patient reports feeling unchanged and would wear PAP mask at home

## 2022-12-12 DIAGNOSIS — G47.33 OSA (OBSTRUCTIVE SLEEP APNEA): Primary | ICD-10-CM

## 2022-12-16 ENCOUNTER — TELEPHONE (OUTPATIENT)
Dept: PULMONOLOGY | Facility: CLINIC | Age: 57
End: 2022-12-16

## 2022-12-16 NOTE — TELEPHONE ENCOUNTER
----- Message from Denice Gonzalez sent at 12/15/2022  1:05 PM EST -----  Regarding: Sleep study results   Contact: [phone]  Hi  I completed an in house CPAP sleep study on Friday 12/9/22  I received the results from the test by Dr Jessica Del Castillo and it appears that he is prescribing a CPAP machine  I’m trying to find out how to go about making an appointment     Thank you   Jonathan Engel

## 2022-12-16 NOTE — TELEPHONE ENCOUNTER
LM for pt to discuss [facility]  at [address]

## 2022-12-22 LAB

## 2022-12-27 NOTE — PROGRESS NOTES
CPAP order processed through [facility]   Patient is Scheduled in [place] pulmonary office 1/3/2023

## 2022-12-28 LAB
DME PARACHUTE DELIVERY DATE REQUESTED: NORMAL
DME PARACHUTE DELIVERY NOTE: NORMAL
DME PARACHUTE ITEM DESCRIPTION: NORMAL
DME PARACHUTE ORDER STATUS: NORMAL
DME PARACHUTE SUPPLIER NAME: NORMAL
DME PARACHUTE SUPPLIER PHONE: NORMAL

## 2023-01-03 LAB
DME PARACHUTE DELIVERY DATE ACTUAL: NORMAL
DME PARACHUTE DELIVERY DATE REQUESTED: NORMAL
DME PARACHUTE DELIVERY NOTE: NORMAL
DME PARACHUTE ITEM DESCRIPTION: NORMAL
DME PARACHUTE ORDER STATUS: NORMAL
DME PARACHUTE SUPPLIER NAME: NORMAL
DME PARACHUTE SUPPLIER PHONE: NORMAL

## 2023-02-22 ENCOUNTER — OFFICE VISIT (OUTPATIENT)
Dept: PULMONOLOGY | Facility: MEDICAL CENTER | Age: 58
End: 2023-02-22

## 2023-02-22 VITALS
RESPIRATION RATE: 12 BRPM | TEMPERATURE: 98.2 F | HEART RATE: 102 BPM | HEIGHT: 68 IN | DIASTOLIC BLOOD PRESSURE: 92 MMHG | WEIGHT: 217 LBS | OXYGEN SATURATION: 98 % | SYSTOLIC BLOOD PRESSURE: 156 MMHG | BODY MASS INDEX: 32.89 KG/M2

## 2023-02-22 DIAGNOSIS — R09.82 POST-NASAL DRIP: ICD-10-CM

## 2023-02-22 DIAGNOSIS — G25.81 RESTLESS LEGS: ICD-10-CM

## 2023-02-22 DIAGNOSIS — E66.09 CLASS 1 OBESITY DUE TO EXCESS CALORIES WITHOUT SERIOUS COMORBIDITY WITH BODY MASS INDEX (BMI) OF 32.0 TO 32.9 IN ADULT: ICD-10-CM

## 2023-02-22 DIAGNOSIS — G47.33 OSA (OBSTRUCTIVE SLEEP APNEA): Primary | ICD-10-CM

## 2023-02-22 DIAGNOSIS — R03.0 ELEVATED BLOOD PRESSURE READING: ICD-10-CM

## 2023-02-22 RX ORDER — FLUTICASONE PROPIONATE 50 MCG
1 SPRAY, SUSPENSION (ML) NASAL DAILY
Qty: 16 G | Refills: 5 | Status: SHIPPED | OUTPATIENT
Start: 2023-02-22

## 2023-02-22 NOTE — ASSESSMENT & PLAN NOTE
Sky Ovidio has had postnasal drip for many years  He is interested in attempting to manage this  I discussed Flonase, including indications and use  He is agreeable  Prescription was sent to his pharmacy  If he does not have symptomatic benefit with this, he will return call to the office and we can trial ipratropium nasal spray

## 2023-02-22 NOTE — ASSESSMENT & PLAN NOTE
Sky Nolan has had his CPAP machine since approximately January 3  He is wearing it nightly and sleeps well for approximately the first 4 hours of the night  He wakes up with a very dry mouth and when he returns to sleep, he does not put the mask back on due to dryness  He sleeps approximately 7 hours nightly, but has only been wearing the mask an average of 4 hours and 12 minutes per compliance review  His average AHI is 1 2 with use of the machine, down from 30 1 during his sleep study  He does not notice particular symptomatic benefit during the day at this time  This could be due to his average usage of greater than or equal to 4 hours being only 63%  We discussed increasing compliance with his CPAP  He is agreeable  His maximum pressure was 12 6 via compliance data and I will change his settings from 11 to 20 cm water to 11 to 16 cm water  I also discussed his heated tubing settings, as well as his humidity settings  He will augment these and if he does not have improvement, he will return call in a week or 2  He will follow-up in 30 days for repeat compliance evaluation

## 2023-02-22 NOTE — ASSESSMENT & PLAN NOTE
Indira Echavarria has known hypertension and was on lisinopril, but stopped taking it due to dry cough as a side effect  His blood pressure was elevated today  I advised him to call his PCP today to address his blood pressure  I removed lisinopril from his medication list because he reports he will not be restarting it  He was aware of the importance of calling his PCP to manage his blood pressure

## 2023-02-22 NOTE — PROGRESS NOTES
Pulmonary Follow-Up Note   Juliann Luke 66 y o  male MRN: [id number]  2/22/2023      Assessment/Plan:    Problem List Items Addressed This Visit        Respiratory    SHELLEY (obstructive sleep apnea) - Primary     Dunia Ferrara has had his CPAP machine since approximately January 3  He is wearing it nightly and sleeps well for approximately the first 4 hours of the night  He wakes up with a very dry mouth and when he returns to sleep, he does not put the mask back on due to dryness  He sleeps approximately 7 hours nightly, but has only been wearing the mask an average of 4 hours and 12 minutes per compliance review  His average AHI is 1 2 with use of the machine, down from 30 1 during his sleep study  He does not notice particular symptomatic benefit during the day at this time  This could be due to his average usage of greater than or equal to 4 hours being only 63%  We discussed increasing compliance with his CPAP  He is agreeable  His maximum pressure was 12 6 via compliance data and I will change his settings from 11 to 20 cm water to 11 to 16 cm water  I also discussed his heated tubing settings, as well as his humidity settings  He will augment these and if he does not have improvement, he will return call in a week or 2  He will follow-up in 30 days for repeat compliance evaluation  Relevant Orders    PAP DME Pressure Change       Other    Elevated blood pressure reading     Dunia Ferrara has known hypertension and was on lisinopril, but stopped taking it due to dry cough as a side effect  His blood pressure was elevated today  I advised him to call his PCP today to address his blood pressure  I removed lisinopril from his medication list because he reports he will not be restarting it  He was aware of the importance of calling his PCP to manage his blood pressure           Class 1 obesity due to excess calories without serious comorbidity with body mass index (BMI) of 32 0 to 32 9 in adult     Lifestyle modifications and weight loss as able  Post-nasal drip     Tiera Mcdonald has had postnasal drip for many years  He is interested in attempting to manage this  I discussed Flonase, including indications and use  He is agreeable  Prescription was sent to his pharmacy  If he does not have symptomatic benefit with this, he will return call to the office and we can trial ipratropium nasal spray  Relevant Medications    fluticasone (FLONASE) 50 mcg/act nasal spray    Restless legs     Scot had increased leg movements during his CPAP titration study  He does not notice this  He will increase compliance with his CPAP and if he has trouble with this or notices increased symptoms, we will get lab work to evaluate for any other etiologies  He would like to hold off for now  Education provided at this visit:   Need for Vaccination: Does not get flu/pneumonia/COVID vaccines   Smoking Cessation: Never smoker    Return in about 4 weeks (around 3/22/2023), or if symptoms worsen or fail to improve  All of Scot's questions were answered prior to leaving the office today  He will follow-up with Dr Fabrizio Mar in six months or sooner should the need arise  He is aware to call our office with any further questions or concerns  History of Present Illness   Reason for Visit: Follow-up  Chief Complaint: "I feel okay "  HPI: Wicho Saunders is a 66 y o  male who presents to the office today for CPAP compliance evaluation  Tiera Mcdonald reports that he has been wearing his machine nightly, but wakes up in the middle of the night due to extreme dryness and the need to drink water  He puts the mask on at approximately 11:00 when he goes to sleep and sleeps well with it, but once he wakes up approximately 4 to 5 hours later, he does not put the mask back on when he goes back to sleep for another 3 hours  He has not noticed a major change in his fatigue during the day  He denies any limb movement symptoms at night    He does note postnasal drip, but reports this has been going on for many years  Aside from the above, no other complaints  Review of Systems   Constitutional: Negative for appetite change and fever  HENT: Positive for postnasal drip  Negative for ear pain, rhinorrhea, sneezing, sore throat and trouble swallowing  Cardiovascular: Negative for chest pain  Musculoskeletal: Negative for myalgias  Neurological: Negative for headaches  All other systems reviewed and are negative  A full 12-point review of systems was completed and is negative except for those outlined in the HPI      Historical Information   Past Medical History:   Diagnosis Date   • Closed fracture of thoracic vertebral body ([facility], [place] [address] ) 2014   • Erectile dysfunction    • Hypertension    • Snores    • Varicose vein of leg      Past Surgical History:   Procedure Laterality Date   • COLONOSCOPY  2017   • NO PAST SURGERIES       Family History   Problem Relation Age of Onset   • Cancer Mother         ovarian   • Ovarian cancer Mother    • Ovarian cancer Family    • Prostate cancer Family      Social History   Social History     Substance and Sexual Activity   Alcohol Use Yes    Comment: socially     Social History     Substance and Sexual Activity   Drug Use Never     Social History     Tobacco Use   Smoking Status Never   Smokeless Tobacco Never     E-Cigarette/Vaping   • E-Cigarette Use Never User      E-Cigarette/Vaping Substances   • Nicotine No    • THC No    • CBD No    • Flavoring No    • Other No    • Unknown No        Meds/Allergies     Current Outpatient Medications:   •  fluticasone (FLONASE) 50 mcg/act nasal spray, 1 spray into each nostril daily, Disp: 16 g, Rfl: 5  •  vardenafil (LEVITRA) 20 MG tablet, TAKE ONE TABLET BY MOUTH DAILY AS NEEDED FOR ERECTILE DYSFUNCTION, Disp: 4 tablet, Rfl: 10  •  venlafaxine (EFFEXOR-XR) 37 5 mg 24 hr capsule, Take 1 capsule (37 5 mg total) by mouth daily (Patient taking differently: Take 37 5 mg by mouth every morning), Disp: 90 capsule, Rfl: 3  No Known Allergies    Vitals: Blood pressure 156/92, pulse 102, temperature 98 2 °F (36 8 °C), temperature source Temporal, resp  rate 12, height 5' 8" (1 727 m), weight 98 4 kg (217 lb), SpO2 98 %  Body mass index is 32 99 kg/m²  Oxygen Therapy  SpO2: 98 %    Physical Exam:  Physical Exam  Vitals reviewed  Constitutional:       General: He is not in acute distress  Appearance: He is well-developed  He is not toxic-appearing or diaphoretic  HENT:      Head: Normocephalic and atraumatic  Eyes:      General: No scleral icterus  Neck:      Trachea: No tracheal deviation  Cardiovascular:      Rate and Rhythm: Normal rate and regular rhythm  Heart sounds: S1 normal and S2 normal  No murmur heard  No friction rub  No gallop  Pulmonary:      Effort: Pulmonary effort is normal  No tachypnea, accessory muscle usage or respiratory distress  Breath sounds: Normal breath sounds  No stridor  No decreased breath sounds, wheezing, rhonchi or rales  Chest:      Chest wall: No tenderness  Abdominal:      General: Bowel sounds are normal  There is no distension  Palpations: Abdomen is soft  Tenderness: There is no abdominal tenderness  There is no guarding or rebound  Musculoskeletal:         General: No tenderness  Cervical back: Neck supple  Right lower leg: No edema  Left lower leg: No edema  Skin:     General: Skin is warm and dry  Findings: No rash  Neurological:      Mental Status: He is alert and oriented to person, place, and time  GCS: GCS eye subscore is 4  GCS verbal subscore is 5  GCS motor subscore is 6  Psychiatric:         Speech: Speech normal          Behavior: Behavior normal  Behavior is cooperative  Diagnostic sleep study showed AHI of 30 1 and subsequent CPAP titration study showed need for auto CPAP with range 11 to 20 cm water        TODD Godoy  [facility]        Portions of the record may have been created with voice recognition software  Occasional wrong word or "sound a like" substitutions may have occurred due to the inherent limitations of voice recognition software  Read the chart carefully and recognize, using context, where substitutions have occurred or contact the dictating provider      Answers for HPI/ROS submitted by the patient on 2/21/2023  When did you first notice your symptoms?: more than 1 month ago  How often do your symptoms occur?: daily  Since you first noticed this problem, how has it changed?: unchanged  Do you have shortness of breath that occurs with effort or exertion?: No  Do you have ear congestion?: No  Do you have heartburn?: No  Do you have fatigue?: No  Do you have nasal congestion?: No  Do you have shortness of breath when lying flat?: No  Do you have shortness of breath when you wake up?: No  Do you have sweats?: No  Have you experienced weight loss?: No

## 2023-02-22 NOTE — ASSESSMENT & PLAN NOTE
Nima Garrison had increased leg movements during his CPAP titration study  He does not notice this  He will increase compliance with his CPAP and if he has trouble with this or notices increased symptoms, we will get lab work to evaluate for any other etiologies  He would like to hold off for now

## 2023-03-27 DIAGNOSIS — F41.9 ANXIETY: ICD-10-CM

## 2023-03-27 RX ORDER — VENLAFAXINE HYDROCHLORIDE 37.5 MG/1
37.5 CAPSULE, EXTENDED RELEASE ORAL EVERY MORNING
Qty: 90 CAPSULE | Refills: 3 | Status: SHIPPED | OUTPATIENT
Start: 2023-03-27

## 2023-03-28 ENCOUNTER — OFFICE VISIT (OUTPATIENT)
Dept: PULMONOLOGY | Facility: MEDICAL CENTER | Age: 58
End: 2023-03-28

## 2023-03-28 VITALS
RESPIRATION RATE: 12 BRPM | HEART RATE: 92 BPM | HEIGHT: 68 IN | OXYGEN SATURATION: 99 % | BODY MASS INDEX: 32.13 KG/M2 | SYSTOLIC BLOOD PRESSURE: 138 MMHG | DIASTOLIC BLOOD PRESSURE: 86 MMHG | WEIGHT: 212 LBS | TEMPERATURE: 98 F

## 2023-03-28 DIAGNOSIS — G25.81 RESTLESS LEGS: ICD-10-CM

## 2023-03-28 DIAGNOSIS — R09.82 POST-NASAL DRIP: ICD-10-CM

## 2023-03-28 DIAGNOSIS — I10 HYPERTENSION, UNSPECIFIED TYPE: ICD-10-CM

## 2023-03-28 DIAGNOSIS — G47.33 OSA (OBSTRUCTIVE SLEEP APNEA): Primary | ICD-10-CM

## 2023-03-28 DIAGNOSIS — E66.09 CLASS 1 OBESITY DUE TO EXCESS CALORIES WITHOUT SERIOUS COMORBIDITY WITH BODY MASS INDEX (BMI) OF 32.0 TO 32.9 IN ADULT: ICD-10-CM

## 2023-03-28 NOTE — PROGRESS NOTES
Pulmonary Follow-Up Note   Padma Choi 66 y o  male MRN: [id number]  3/28/2023      Assessment/Plan:    Problem List Items Addressed This Visit        Respiratory    SHELLEY (obstructive sleep apnea) - Primary     Compliance data was reviewed  Loki Zabala was compliant for 80% of the time at greater than or equal to 4 hours with an average usage of 5 hours and 41 nightly with his CPAP AutoSet from 11 to 16 cm water  His AHI was 1 3 with use  95th percentile of leaks was 16 8 L/min with a maximum of 33 8 L/min  Median pressure was 11 7 with maximum pressure of 13 6  He will continue with nightly use of his CPAP and is deriving benefit  I did order a mask fitting for him and he may benefit from the F30i mask based on sleeping habits and leak around the bridge of his nose  He is aware of proper maintenance of his CPAP machine and regular resupply of mask/filters  He will follow-up in 1 year or sooner if needed  Relevant Orders    Mask fitting only       Cardiovascular and Mediastinum    HTN (hypertension)     Again advised Scot to follow-up with his PCP regarding his blood pressure  He is in agreement  Other    Class 1 obesity due to excess calories without serious comorbidity with body mass index (BMI) of 32 0 to 32 9 in adult     Lifestyle modifications and weight loss as able  Post-nasal drip     He will continue Flonase  This is helping him  Restless legs     He does not notice any leg movements  He felt he was moving his legs a lot during his sleep study due to uncomfortable positioning  We will hold on labs for now and he will continue to monitor symptoms  He will call sooner than his next appointment if increased leg movements are noted and lab work can be ordered at that time  Return in about 1 year (around 3/28/2024), or if symptoms worsen or fail to improve  All of Scot's questions were answered prior to leaving the office today   He will follow-up with   "[facility] in 12 months or sooner should the need arise  He is aware to call our office with any further questions or concerns  History of Present Illness   Reason for Visit: Follow-up  Chief Complaint: \"I feel pretty good  \"  HPI: Israel Sanchez is a 66 y o  male who presents to the office today for CPAP compliance evaluation  Over the last month, Giovanni Rosales has increased the use of his CPAP  He did manipulate his humidity and heat settings and I also did decrease his pressure range  He has noted improvement in his significant dry mouth  He is wearing his mask nightly and feels well with it  He does note occasional leak of the mask around the bridge of his nose, which causes dry eyes  This does not happen all the time, but can be particularly bothersome  He and his wife have not noticed any increased leg movements  Aside from the above, no other complaints  At the last visit, he was given Flonase for postnasal drip and has noted some improvement  He was also advised to follow-up with his PCP for elevated blood pressure off lisinopril  He has not yet done this  Review of Systems   All other systems reviewed and are negative  A full 12-point review of systems was completed and is negative except for those outlined in the HPI      Historical Information   Past Medical History:   Diagnosis Date   • Closed fracture of thoracic vertebral body ([facility] [address] ) 2014   • Erectile dysfunction    • Hypertension    • Snores    • Varicose vein of leg      Past Surgical History:   Procedure Laterality Date   • COLONOSCOPY  2017   • NO PAST SURGERIES       Family History   Problem Relation Age of Onset   • Cancer Mother         ovarian   • Ovarian cancer Mother    • Ovarian cancer Family    • Prostate cancer Family      Social History   Social History     Substance and Sexual Activity   Alcohol Use Yes    Comment: socially     Social History     Substance and Sexual Activity   Drug Use Never     Social History     Tobacco Use   Smoking " "Status Never   Smokeless Tobacco Never     E-Cigarette/Vaping   • E-Cigarette Use Never User      E-Cigarette/Vaping Substances   • Nicotine No    • THC No    • CBD No    • Flavoring No    • Other No    • Unknown No        Meds/Allergies     Current Outpatient Medications:   •  fluticasone (FLONASE) 50 mcg/act nasal spray, 1 spray into each nostril daily, Disp: 16 g, Rfl: 5  •  vardenafil (LEVITRA) 20 MG tablet, TAKE ONE TABLET BY MOUTH DAILY AS NEEDED FOR ERECTILE DYSFUNCTION, Disp: 4 tablet, Rfl: 10  •  venlafaxine (EFFEXOR-XR) 37 5 mg 24 hr capsule, Take 1 capsule (37 5 mg total) by mouth every morning, Disp: 90 capsule, Rfl: 3  No Known Allergies    Vitals: Blood pressure 138/86, pulse 92, temperature 98 °F (36 7 °C), temperature source Temporal, resp  rate 12, height 5' 8\" (1 727 m), weight 96 2 kg (212 lb), SpO2 99 %  Body mass index is 32 23 kg/m²  Oxygen Therapy  SpO2: 99 %    Physical Exam:  Physical Exam  Vitals reviewed  Constitutional:       General: He is not in acute distress  Appearance: He is well-developed  He is obese  He is not toxic-appearing or diaphoretic  HENT:      Head: Normocephalic and atraumatic  Eyes:      General: No scleral icterus  Neck:      Trachea: No tracheal deviation  Cardiovascular:      Rate and Rhythm: Normal rate  Pulmonary:      Effort: Pulmonary effort is normal  No tachypnea, accessory muscle usage or respiratory distress  Breath sounds: Normal breath sounds  No stridor  Chest:      Chest wall: No tenderness  Musculoskeletal:         General: No tenderness  Cervical back: Neck supple  Right lower leg: No edema  Left lower leg: No edema  Skin:     General: Skin is warm and dry  Findings: No rash  Neurological:      Mental Status: He is alert and oriented to person, place, and time  GCS: GCS eye subscore is 4  GCS verbal subscore is 5  GCS motor subscore is 6     Psychiatric:         Speech: Speech normal          " "Behavior: Behavior normal  Behavior is cooperative  Pulmonary Results (PFTs, PSG): I have personally reviewed pertinent reports  CPAP study done December 10, 2022 showed sleep disordered breathing events/AHI improved from 30 down to 3 with use of CPAP at 11 cm water  There was also increased periodic limb movements  TODD Carreon  [facility]        Portions of the record may have been created with voice recognition software  Occasional wrong word or \"sound a like\" substitutions may have occurred due to the inherent limitations of voice recognition software  Read the chart carefully and recognize, using context, where substitutions have occurred or contact the dictating provider    "

## 2023-03-28 NOTE — ASSESSMENT & PLAN NOTE
He does not notice any leg movements  He felt he was moving his legs a lot during his sleep study due to uncomfortable positioning  We will hold on labs for now and he will continue to monitor symptoms  He will call sooner than his next appointment if increased leg movements are noted and lab work can be ordered at that time

## 2023-03-28 NOTE — ASSESSMENT & PLAN NOTE
Compliance data was reviewed  Kolton Duque was compliant for 80% of the time at greater than or equal to 4 hours with an average usage of 5 hours and 41 nightly with his CPAP AutoSet from 11 to 16 cm water  His AHI was 1 3 with use  95th percentile of leaks was 16 8 L/min with a maximum of 33 8 L/min  Median pressure was 11 7 with maximum pressure of 13 6  He will continue with nightly use of his CPAP and is deriving benefit  I did order a mask fitting for him and he may benefit from the F30i mask based on sleeping habits and leak around the bridge of his nose  He is aware of proper maintenance of his CPAP machine and regular resupply of mask/filters  He will follow-up in 1 year or sooner if needed

## 2023-08-03 DIAGNOSIS — N52.8 OTHER MALE ERECTILE DYSFUNCTION: ICD-10-CM

## 2023-08-04 RX ORDER — VARDENAFIL HYDROCHLORIDE 20 MG/1
TABLET ORAL
Qty: 4 TABLET | Refills: 10 | Status: SHIPPED | OUTPATIENT
Start: 2023-08-04

## 2023-11-06 ENCOUNTER — TELEPHONE (OUTPATIENT)
Dept: FAMILY MEDICINE CLINIC | Facility: CLINIC | Age: 58
End: 2023-11-06

## 2023-11-06 DIAGNOSIS — H91.90 DECREASED HEARING, UNSPECIFIED LATERALITY: Primary | ICD-10-CM

## 2023-11-06 NOTE — TELEPHONE ENCOUNTER
VIPUL left on clinical line:    Hi, I'm calling for an authorization for my . His name is Scot Man. Date of birth is [birth year]. He's got an appointment for an audiological exam this Thursday afternoon and I was instructed to call and just have an authorization put into the system for [facility]. My number is [phone]. Thank you. Patient needs a referral for audiology.

## 2023-11-09 ENCOUNTER — OFFICE VISIT (OUTPATIENT)
Dept: AUDIOLOGY | Facility: CLINIC | Age: 58
End: 2023-11-09
Payer: COMMERCIAL

## 2023-11-09 DIAGNOSIS — H90.3 SENSORINEURAL HEARING LOSS (SNHL), BILATERAL: Primary | ICD-10-CM

## 2023-11-09 PROCEDURE — 92567 TYMPANOMETRY: CPT | Performed by: AUDIOLOGIST

## 2023-11-09 PROCEDURE — 92557 COMPREHENSIVE HEARING TEST: CPT | Performed by: AUDIOLOGIST

## 2023-11-09 NOTE — PROGRESS NOTES
ADULT HEARING EVALUATION - [address] AUDIOLOGY      Patient Name: Kendal Simms   MRN:  [id number]   :  1965   Age: 66 y.o. Gender: male   DOS: 2023     HISTORY:     Kendal Simms, a 66 y.o. male, was seen on 2023 at the referral of Dr. Antionette Garcia for an audiometric evaluation. Mr. Sid Alanis was unaccompanied to today's visit. Mr. Sid Alanis is a new patient to our practice. Today, Mr. Jaime Herbert primary complaint(s) was bilateral hearing loss. Onset of symptoms reportedly began gradually over time. Mr. Sid Alanis denied otalgia, otorrhea, aural fullness, and dizziness. He endorsed left sided tinnitus. History of otitis was negative. Mr. Sid Alanis has no history of ear surgeries. Family history of hearing loss was negative. Communication difficulties include needing to ask for speech repetitions and difficulty hearing both his wife and the television . History of noise exposure is positive. Other documented medical history states that Mr. Sid Alanis  has a past medical history of Closed fracture of thoracic vertebral body ([address]) (), Erectile dysfunction, Hypertension, Snores, and Varicose vein of leg. Mr. Sid Alanis has not had his hearing tested previously.      RESULTS:    Otoscopic Evaluation:   Right Ear: Unremarkable, canal clear   Left Ear: Unremarkable, canal clear    Tympanometry:   Right Ear: Type A; normal middle ear pressure and static compliance    Left Ear: Type A; normal middle ear pressure and static compliance     Audiometry:  Conventional pure tone audiometry from 250 - 8000 Hz  obtained with good reliability and revealed the following:     Right Ear: normal to moderately-severe sensorineural hearing loss (SNHL)   Left Ear: normal to moderately-severe sensorineural hearing loss (SNHL)     Speech Audiometry:    Speech Reception (SRT)   Right Ear: 25 dB HL   Left Ear: 30 dB HL    Word Recognition Scores (WRS):  Right Ear: excellent (100 % correct)     Left Ear: excellent (100 % correct)   Stimuli: [id number]    IMPRESSIONS:  Bilateral SNHL, normal to mod-sev HFSNHL AU. The results of today's findings were reviewed with Mr. Christianne Chapa and his hearing thresholds were explained at length. Treatment options, including amplification and communication strategies, were discussed as appropriate. Mr. Christianne Chapa voiced understanding of his test results and had no further questions. RECOMMENDATIONS:    1.) Follow-up with referring provider. 2.) Based on today's findings and patient symptoms, Mr. Christianne Chapa is a candidate for a trial with amplification. A hearing aid evaluation to further discuss Mr. Colt Johnson lifestyle, communication needs, and develop a treatment plan for their hearing loss is recommended. 3.) Establishment of care with ENT recommended for hearing loss asymmetry and consideration of further workup to rule-out retrocochlear pathology and discuss left tinnitus further. *see attached audiogram*    It was a pleasure working with Mr. Christianne Chapa today. Thank you for referring this patient. Shanna Moeller.   Clinical Audiologist    [address] [id number]

## 2023-11-17 NOTE — ADDENDUM NOTE
11/17/23 1339   Reason for Consult   Discipline Child Life Specialist   Reason for Consult Coping skill development/planning;Normalization of environment;Preparation;Family support   Preparation Procedural   Referral Source Self;Physician/Resident   Total Time Spent (min) 30 minutes   Anxiety Level   Anxiety Level No distress noted or observed   Patient Intervention(s)   Type of Intervention Performed Healing environment interventions;Preparation interventions;Procedural support interventions   Healing Environment Intervention(s) Address practical patient/family needs;Assessment;Assist healthcare team with behavioral management strategies;Empathetic listening/validation of emotions;Coping skill development/planning;Coping plan implementation;Facilitate calming/relaxation;Medical play;Normalization of environment;Rapport building;Orientation to services;Treatment compliance   Preparation Intervention(s) Address misconceptions;Coping skill development;Coping plan development/coordination/implemention;Medical play/demonstration to address learning;Medical/procedural preparation   Procedural Support Intervention(s) Advocacy;Alternative focus;Comfort positioning;Coping plan implementation;Parent coaching and support   Support Provided to Family   Support Provided to Family Family present for patient session   Family Present for Patient Session Parent(s)/guardian(s)   Family Participation Supportive   Number of family members present 2   Number of staff members present 3     Family and Child Life Services   Child Life Services introduced to pt and family. Preparation, support, and distraction provided to pt for suturing. Pt engaged in medical play preparation. Support and distraction provided during procedure. Pt watched videos on ipad. Parents supportive and comforting to pt throughout preparation and procedure. Parents expressed appreciation for supportive services.    Addended by: Tyler Pillai on: 2/19/2021 10:51 AM     Modules accepted: Orders

## 2023-11-27 ENCOUNTER — OFFICE VISIT (OUTPATIENT)
Dept: AUDIOLOGY | Facility: CLINIC | Age: 58
End: 2023-11-27

## 2023-11-27 DIAGNOSIS — H90.3 SENSORY HEARING LOSS, BILATERAL: Primary | ICD-10-CM

## 2023-11-27 NOTE — PROGRESS NOTES
HEARING AID EVALUTION - [address] AUDIOLOGY    Patient Name: Sarah Shabazz   MRN:  [id number]   :  1965   Age: 66 y.o. Gender: male   DOS: 2023     Sarah Shabazz was seen today for a hearing aid evaluation following his audiometric testing performed on 2023. Mr. Mario Ahmadi was accompanied by his wife Chon Bertrand to today's visit. Mr. Mario Ahmadi was referred by Dr. Zohreh Alfaro. The audiometric evaluation on 2023 revealed normal to moderately-severe  high frequency (HF) noise notch sensorineural hearing loss (SNHL) bilaterally. Word recognition scores were excellent in the right ear and excellent in the left ear. This findings were reviewed with Mr. Mario Ahmadi. All of his questions regarding his hearing status were addressed, and the importance of realistic expectations of hearing loss and amplification were emphasized. Hearing aids are assistive devices and are not designed to restore normal hearing. The difference between peripheral hearing loss and speech understanding (central hearing loss) was explained. While improvement with amplification is possible, there will always be word understanding problems due to the inherent nature of hearing loss. These problems will be greater in background noise than in quiet situations. Mr. Mario Ahmadi was counseled on the importance of self-advocacy, motivation, as well as effective communication strategies that can be used to optimize hearing aid success. Mr. 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 Mr. Mario Ahmadi is aware of this being beneficial, but he is not interested in needing to take them on and off repeatedly throughout the day. Mr. Mario Ahmadi mentioned that he wears hearing protection while hunting, which was praised and recommended to be continued.     These effective communication strategies include:   1.) Maintaining face-to-face communication, allowing for speechreading of facial expressions, lips, and gestures. 2.) Reducing background noise and distance between communication partners. 3.) Having communication partners reduce their rate of speech when appropriate. 4.) Beginning conversation by getting communication partner's attention and stating the topic, allowing for context clues to help fill in gaps in comprehension. 5.) Asking for rephrasing of aspects of conversation that are missed when needed rather than asking for repetitions. Based on these results, Mr. Jaime Herbert prioritized communication difficulties include:     1.) Hearing his wife better in general.  2.) Hearing friends and family when out at restaurants. 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 Strengths and limitations of amplification, including various hearing aid styles, options, and circuitry were discussed at length with Mr. Sid Alanis. Mr. Sid Alanis would like to take some additional time to review the information discussed today. If he had to choose a hearing aid today, Mr. Sid Alanis is leaning towards the [employer] RICs. Mr. Sid Alanis has a $1500/ear every 48 months (4 years) benefit through his insurance that was discussed with the patient. Future steps in the process of getting him hearing aids was discussed. Mr. Sid Alanis wished to defer purchasing hearing aids at this time so that the holiday season may pass. Mr. Sid Alanis will return in January 2024 for a follow up HAE to answer any additional questions he may have as well as make a decision on whether to move forward either way. It was a pleasure working with Mr. Sid Alanis. They were encouraged to contact the clinic with any further questions or concerns in the meantime.      NIEVES Pelaez  3rd year Audiology student    [address] [id number]    Ena White, AuD.  Clinical Audiologist    [address] [id number]

## 2024-03-18 ENCOUNTER — OFFICE VISIT (OUTPATIENT)
Dept: URGENT CARE | Facility: CLINIC | Age: 59
End: 2024-03-18
Payer: COMMERCIAL

## 2024-03-18 VITALS
RESPIRATION RATE: 18 BRPM | OXYGEN SATURATION: 98 % | BODY MASS INDEX: 31.93 KG/M2 | HEART RATE: 120 BPM | TEMPERATURE: 98.7 F | SYSTOLIC BLOOD PRESSURE: 160 MMHG | WEIGHT: 210 LBS | DIASTOLIC BLOOD PRESSURE: 90 MMHG

## 2024-03-18 DIAGNOSIS — J06.9 VIRAL UPPER RESPIRATORY TRACT INFECTION: Primary | ICD-10-CM

## 2024-03-18 DIAGNOSIS — R05.1 ACUTE COUGH: ICD-10-CM

## 2024-03-18 PROCEDURE — 99213 OFFICE O/P EST LOW 20 MIN: CPT | Performed by: PHYSICIAN ASSISTANT

## 2024-03-18 RX ORDER — BENZONATATE 200 MG/1
200 CAPSULE ORAL 3 TIMES DAILY PRN
Qty: 20 CAPSULE | Refills: 0 | Status: SHIPPED | OUTPATIENT
Start: 2024-03-18

## 2024-03-18 NOTE — PROGRESS NOTES
[facility]        NAME: Niels Grace is a 59 y.o. male  : 1965    MRN: [id number]  DATE: 2024  TIME: 12:09 PM    Assessment and Plan   Viral upper respiratory tract infection [J06.9]  1. Viral upper respiratory tract infection        2. Acute cough  benzonatate (TESSALON) 200 MG capsule        Patient Instructions   Viral upper respiratory infection  Recommend flonase nasal spray for postnasal drip/cough  Warm salt water gargles  Rest, fluids and supportive care  May benefit from a cool mist humidifier on night stand  Tylenol/ibuprofen as needed for pain/fever    Follow up with PCP in 3-5 days.  Proceed to  ER if symptoms worsen.    If tests have been performed at [facility], our office will contact you with results if changes need to be made to the care plan discussed with you at the visit.  [facility]    Chief Complaint     Chief Complaint   Patient presents with    Cough     Pt c/o cough that started on Thursday         History of Present Illness       Scot is a 59-year-old male presents to clinic complaining of persistent cough x 4 days.  He describes the cough as dry nonproductive and worse when he lays down at night.  He states he is not able to get a good night sleep due to cough.  He denies any fever, chills, fatigue, myalgias, nasal congestion, ear pain, sore throat, shortness of breath, nausea, vomiting, diarrhea, loss of taste or smell, recent travel, or exposure to anyone known COVID-positive.        Review of Systems   Review of Systems   Constitutional:  Negative for chills, fatigue and fever.   HENT:  Positive for postnasal drip. Negative for congestion, ear pain, rhinorrhea and sore throat.    Respiratory:  Positive for cough. Negative for shortness of breath.    Gastrointestinal:  Negative for diarrhea, nausea and vomiting.   Musculoskeletal:  Negative for myalgias.   Neurological:  Negative for headaches.         Current  Medications       Current Outpatient Medications:     benzonatate (TESSALON) 200 MG capsule, Take 1 capsule (200 mg total) by mouth 3 (three) times a day as needed for cough, Disp: 20 capsule, Rfl: 0    fluticasone (FLONASE) 50 mcg/act nasal spray, 1 spray into each nostril daily, Disp: 16 g, Rfl: 5    vardenafil (LEVITRA) 20 MG tablet, TAKE ONE TABLET BY MOUTH DAILY AS NEEDED FOR ERECTILE DYSFUNCTION, Disp: 4 tablet, Rfl: 10    venlafaxine (EFFEXOR-XR) 37.5 mg 24 hr capsule, Take 1 capsule (37.5 mg total) by mouth every morning, Disp: 90 capsule, Rfl: 3    Current Allergies     Allergies as of 03/18/2024    (No Known Allergies)            The following portions of the patient's history were reviewed and updated as appropriate: allergies, current medications, past family history, past medical history, past social history, past surgical history and problem list.     Past Medical History:   Diagnosis Date    Closed fracture of thoracic vertebral body (HCC) 2014    Erectile dysfunction     Hypertension     Snores     Varicose vein of leg        Past Surgical History:   Procedure Laterality Date    COLONOSCOPY  2017    NO PAST SURGERIES         Family History   Problem Relation Age of Onset    Cancer Mother         ovarian    Ovarian cancer Mother     Ovarian cancer Family     Prostate cancer Family          Medications have been verified.        Objective   /90   Pulse (!) 120   Temp 98.7 °F (37.1 °C) (Temporal)   Resp 18   Wt 95.3 kg (210 lb)   SpO2 98%   BMI 31.93 kg/m²   No LMP for male patient.       Physical Exam     Physical Exam  Vitals and nursing note reviewed.   Constitutional:       General: He is not in acute distress.     Appearance: Normal appearance. He is not ill-appearing.   HENT:      Right Ear: Tympanic membrane, ear canal and external ear normal.      Left Ear: Tympanic membrane, ear canal and external ear normal.      Nose: Nose normal.      Mouth/Throat:      Mouth: Mucous membranes  are moist.      Pharynx: No oropharyngeal exudate or posterior oropharyngeal erythema.   Cardiovascular:      Rate and Rhythm: Normal rate and regular rhythm.      Heart sounds: Normal heart sounds.   Pulmonary:      Effort: Pulmonary effort is normal. No respiratory distress.      Breath sounds: Normal breath sounds. No stridor. No wheezing, rhonchi or rales.   Lymphadenopathy:      Cervical: No cervical adenopathy.   Neurological:      Mental Status: He is alert and oriented to person, place, and time.   Psychiatric:         Mood and Affect: Mood normal.         Behavior: Behavior normal.

## 2024-03-26 ENCOUNTER — TELEPHONE (OUTPATIENT)
Dept: PULMONOLOGY | Facility: MEDICAL CENTER | Age: 59
End: 2024-03-26

## 2024-03-26 NOTE — TELEPHONE ENCOUNTER
LM for patient to call office back to schedule 1yr follow up appointment, schedule next available.

## 2024-05-09 ENCOUNTER — OFFICE VISIT (OUTPATIENT)
Dept: URGENT CARE | Facility: CLINIC | Age: 59
End: 2024-05-09

## 2024-05-09 VITALS
HEART RATE: 118 BPM | SYSTOLIC BLOOD PRESSURE: 150 MMHG | OXYGEN SATURATION: 97 % | DIASTOLIC BLOOD PRESSURE: 88 MMHG | HEIGHT: 68 IN | WEIGHT: 210 LBS | BODY MASS INDEX: 31.83 KG/M2 | RESPIRATION RATE: 20 BRPM | TEMPERATURE: 99.2 F

## 2024-05-09 DIAGNOSIS — R05.2 SUBACUTE COUGH: Primary | ICD-10-CM

## 2024-05-09 RX ORDER — DEXTROMETHORPHAN HYDROBROMIDE AND PROMETHAZINE HYDROCHLORIDE 15; 6.25 MG/5ML; MG/5ML
5 SYRUP ORAL
Qty: 118 ML | Refills: 0 | Status: SHIPPED | OUTPATIENT
Start: 2024-05-09

## 2024-05-09 RX ORDER — DOXYCYCLINE 100 MG/1
100 CAPSULE ORAL 2 TIMES DAILY
Qty: 14 CAPSULE | Refills: 0 | Status: SHIPPED | OUTPATIENT
Start: 2024-05-09 | End: 2024-05-16

## 2024-05-09 RX ORDER — BENZONATATE 100 MG/1
100 CAPSULE ORAL 3 TIMES DAILY PRN
Qty: 20 CAPSULE | Refills: 0 | Status: SHIPPED | OUTPATIENT
Start: 2024-05-09

## 2024-05-09 NOTE — PATIENT INSTRUCTIONS
-Will send in promethazine DM for before bed, precautions discussed. Doxyxycline was sent in as he has bilateral middle ear effusions, subacute coughing and persistent sx. Take with food and a probiotic.   -You should continue your daily flonase and claritin as this may be driven by seasonal allergies.   -Stay very well hydrated and push fluids, gatorade, pedialyte or electrolyte drinks can be beneficial.   -Run a humidifier by your bed and take steam showers to clear mucus   -Zicam nasal AllClear can be soothing to the nasal passages   -Advil or Tylenol for fever or pain.  -Mucinex OTC or Zyrtec or Claritin. Drink plenty of water with the mucinex.   -Honey or throat lozenges for cough may be helpful  -Warm salt water gargles and tea with honey   -Sleep with a few pillows propping you up at night to aid with coughing   -Obtain a pulse ox device and check your O2 1-2 times a day. You want your oxygen levels to be >93%. If they go below 93% go to the ED immediately.   -Vitamin C 500mg, Vitamin D 2000IU daily. You can attempt to use zinc or Zicam up to 30mg per day.   -If your sx worsen or persist follow up with your PCP for recheck. Will consider CXR if no improvement or worsening sx. Red flag signs discussed.

## 2024-05-09 NOTE — PROGRESS NOTES
[facility]        NAME: Niels Grace is a 59 y.o. male  : 1965    MRN: [id number]  DATE: May 9, 2024  TIME: 4:02 PM    Assessment and Plan   Subacute cough [R05.2]  1. Subacute cough  promethazine-dextromethorphan (PHENERGAN-DM) 6.25-15 mg/5 mL oral syrup    benzonatate (TESSALON PERLES) 100 mg capsule    doxycycline monohydrate (MONODOX) 100 mg capsule            Patient Instructions   -Will send in promethazine DM for before bed, precautions discussed. Doxyxycline was sent in as he has bilateral middle ear effusions, subacute coughing and persistent sx. Take with food and a probiotic.   -You should continue your daily flonase and claritin as this may be driven by seasonal allergies.   -Stay very well hydrated and push fluids, gatorade, pedialyte or electrolyte drinks can be beneficial.   -Run a humidifier by your bed and take steam showers to clear mucus   -Zicam nasal AllClear can be soothing to the nasal passages   -Advil or Tylenol for fever or pain.  -Mucinex OTC or Zyrtec or Claritin. Drink plenty of water with the mucinex.   -Honey or throat lozenges for cough may be helpful  -Warm salt water gargles and tea with honey   -Sleep with a few pillows propping you up at night to aid with coughing   -Obtain a pulse ox device and check your O2 1-2 times a day. You want your oxygen levels to be >93%. If they go below 93% go to the ED immediately.   -Vitamin C 500mg, Vitamin D 2000IU daily. You can attempt to use zinc or Zicam up to 30mg per day.   -If your sx worsen or persist follow up with your PCP for recheck. Will consider CXR if no improvement or worsening sx. Red flag signs discussed.         Follow up with PCP in 3-5 days.  Proceed to  ER if symptoms worsen.    If tests have been performed at [facility], our office will contact you with results if changes need to be made to the care plan discussed with you at the visit.  You can review your full results on [facility].    Chief  Complaint     Chief Complaint   Patient presents with    Cold Like Symptoms     Pt here ill x 2 weeks  congestion, tickle in throat, cough, not sleeping,   no fever.   Pt used Nyquil.          History of Present Illness       The patient is a 59-year-old male with a hx of HLD, SHELLEY, HTN who presents today for a two week hx of nasal congestion, itchy throat, coughing. He has been taking Nyquil. He states that he also has bilateral ear fullness. He states that the coughing is dry. He is having trouble sleeping at night due to the cough. No fever, chills, body aches. No dyspnea, wheezing, chest tightness, cp, palpitations. No dizziness or weakness. No GI sx. No sweats or diaphoresis.  Good PO intake. No loss of taste or smell. No lower extremity edema. No hx of asthma or smoking. No known sick contacts or recent travel. No OTC measures. He had a similar presentation back in 3/18/24 and was given tessalon perles. He feels that the cough never fully resolved. He has also been using flonase daily. He has been taking OTC antihistamine.             Review of Systems   Review of Systems   Constitutional:  Negative for activity change, appetite change, chills, diaphoresis, fatigue and fever.   HENT:  Positive for congestion, postnasal drip and rhinorrhea. Negative for ear discharge, ear pain, facial swelling, hearing loss, sinus pressure, sinus pain, sore throat, tinnitus, trouble swallowing and voice change.    Eyes:  Negative for visual disturbance.   Respiratory:  Positive for cough. Negative for apnea, chest tightness, shortness of breath, wheezing and stridor.    Cardiovascular:  Negative for chest pain, palpitations and leg swelling.   Gastrointestinal:  Negative for abdominal distention and abdominal pain.   Genitourinary:  Negative for decreased urine volume.   Musculoskeletal:  Negative for arthralgias, joint swelling, myalgias, neck pain and neck stiffness.   Skin:  Negative for rash.   Allergic/Immunologic: Negative  "for immunocompromised state.   Neurological:  Negative for dizziness, weakness, light-headedness, numbness and headaches.   Hematological:  Negative for adenopathy.         Current Medications       Current Outpatient Medications:     benzonatate (TESSALON PERLES) 100 mg capsule, Take 1 capsule (100 mg total) by mouth 3 (three) times a day as needed for cough, Disp: 20 capsule, Rfl: 0    doxycycline monohydrate (MONODOX) 100 mg capsule, Take 1 capsule (100 mg total) by mouth 2 (two) times a day for 7 days, Disp: 14 capsule, Rfl: 0    fluticasone (FLONASE) 50 mcg/act nasal spray, 1 spray into each nostril daily, Disp: 16 g, Rfl: 5    promethazine-dextromethorphan (PHENERGAN-DM) 6.25-15 mg/5 mL oral syrup, Take 5 mL by mouth daily at bedtime, Disp: 118 mL, Rfl: 0    vardenafil (LEVITRA) 20 MG tablet, TAKE ONE TABLET BY MOUTH DAILY AS NEEDED FOR ERECTILE DYSFUNCTION, Disp: 4 tablet, Rfl: 10    venlafaxine (EFFEXOR-XR) 37.5 mg 24 hr capsule, Take 1 capsule (37.5 mg total) by mouth every morning, Disp: 90 capsule, Rfl: 3    Current Allergies     Allergies as of 05/09/2024    (No Known Allergies)            The following portions of the patient's history were reviewed and updated as appropriate: allergies, current medications, past family history, past medical history, past social history, past surgical history and problem list.     Past Medical History:   Diagnosis Date    Closed fracture of thoracic vertebral body (HCC) 2014    Erectile dysfunction     Hypertension     Snores     Varicose vein of leg        Past Surgical History:   Procedure Laterality Date    COLONOSCOPY  2017    NO PAST SURGERIES         Family History   Problem Relation Age of Onset    Cancer Mother         ovarian    Ovarian cancer Mother     Ovarian cancer Family     Prostate cancer Family          Medications have been verified.        Objective   /88   Pulse (!) 118   Temp 99.2 °F (37.3 °C)   Resp 20   Ht 5' 8\" (1.727 m)   Wt 95.3 kg " (210 lb)   SpO2 97%   BMI 31.93 kg/m²   No LMP for male patient.       Physical Exam     Physical Exam  Vitals and nursing note reviewed.   Constitutional:       General: He is not in acute distress.     Appearance: He is well-developed. He is not ill-appearing, toxic-appearing or diaphoretic.   HENT:      Head: Normocephalic and atraumatic.      Right Ear: Hearing, ear canal and external ear normal. A middle ear effusion is present.      Left Ear: Hearing, ear canal and external ear normal. A middle ear effusion is present.      Nose: Congestion present. No mucosal edema or rhinorrhea.      Right Sinus: No maxillary sinus tenderness or frontal sinus tenderness.      Left Sinus: No maxillary sinus tenderness or frontal sinus tenderness.      Mouth/Throat:      Lips: Pink.      Mouth: Mucous membranes are moist.      Pharynx: Uvula midline. No pharyngeal swelling, oropharyngeal exudate, posterior oropharyngeal erythema or uvula swelling.      Tonsils: No tonsillar exudate or tonsillar abscesses.   Cardiovascular:      Rate and Rhythm: Normal rate and regular rhythm.      Heart sounds: Normal heart sounds, S1 normal and S2 normal. Heart sounds not distant. No murmur heard.     No friction rub. No gallop. No S3 or S4 sounds.   Pulmonary:      Effort: No tachypnea, bradypnea, accessory muscle usage or respiratory distress.      Breath sounds: Normal breath sounds and air entry. No stridor, decreased air movement or transmitted upper airway sounds. No decreased breath sounds, wheezing, rhonchi or rales.   Musculoskeletal:      Cervical back: Normal range of motion and neck supple.      Right lower leg: No edema.      Left lower leg: No edema.   Lymphadenopathy:      Cervical: No cervical adenopathy.      Right cervical: No superficial cervical adenopathy.     Left cervical: No superficial cervical adenopathy.   Neurological:      Mental Status: He is alert and oriented to person, place, and time.   Psychiatric:          Behavior: Behavior normal.

## 2024-05-22 ENCOUNTER — OFFICE VISIT (OUTPATIENT)
Dept: FAMILY MEDICINE CLINIC | Facility: CLINIC | Age: 59
End: 2024-05-22
Payer: COMMERCIAL

## 2024-05-22 VITALS
TEMPERATURE: 98.5 F | RESPIRATION RATE: 18 BRPM | WEIGHT: 209 LBS | HEART RATE: 97 BPM | SYSTOLIC BLOOD PRESSURE: 124 MMHG | BODY MASS INDEX: 32.8 KG/M2 | DIASTOLIC BLOOD PRESSURE: 80 MMHG | HEIGHT: 67 IN

## 2024-05-22 DIAGNOSIS — I10 BENIGN ESSENTIAL HTN: Primary | ICD-10-CM

## 2024-05-22 DIAGNOSIS — Z11.59 NEED FOR HEPATITIS C SCREENING TEST: ICD-10-CM

## 2024-05-22 DIAGNOSIS — Z12.5 SCREENING FOR PROSTATE CANCER: ICD-10-CM

## 2024-05-22 DIAGNOSIS — E78.2 MIXED HYPERLIPIDEMIA: ICD-10-CM

## 2024-05-22 DIAGNOSIS — Z13.6 SCREENING FOR CARDIOVASCULAR CONDITION: ICD-10-CM

## 2024-05-22 DIAGNOSIS — L40.9 PSORIASIS: ICD-10-CM

## 2024-05-22 PROBLEM — R09.82 POST-NASAL DRIP: Status: RESOLVED | Noted: 2023-02-22 | Resolved: 2024-05-22

## 2024-05-22 PROBLEM — R06.83 SNORING: Status: RESOLVED | Noted: 2022-04-12 | Resolved: 2024-05-22

## 2024-05-22 PROCEDURE — 99214 OFFICE O/P EST MOD 30 MIN: CPT | Performed by: FAMILY MEDICINE

## 2024-05-22 RX ORDER — MOMETASONE FUROATE 1 MG/G
CREAM TOPICAL DAILY
Qty: 45 G | Refills: 0 | Status: SHIPPED | OUTPATIENT
Start: 2024-05-22

## 2024-05-22 RX ORDER — CEPHALEXIN 500 MG/1
500 CAPSULE ORAL EVERY 12 HOURS SCHEDULED
Qty: 20 CAPSULE | Refills: 0 | Status: SHIPPED | OUTPATIENT
Start: 2024-05-22 | End: 2024-06-01

## 2024-05-22 NOTE — PROGRESS NOTES
Assessment/Plan:    1. Benign essential HTN  Assessment & Plan:  stable  2. Mixed hyperlipidemia  3. Screening for cardiovascular condition  -     Comprehensive metabolic panel; Future  -     Lipid Panel with Direct LDL reflex; Future  4. Screening for prostate cancer  -     PSA, Total Screen; Future  5. Need for hepatitis C screening test  -     Hepatitis C antibody; Future  6. Psoriasis  -     mometasone (ELOCON) 0.1 % cream; Apply topically daily  -     cephalexin (KEFLEX) 500 mg capsule; Take 1 capsule (500 mg total) by mouth every 12 (twelve) hours for 10 days      Pt will be treated, we will follow response in 2 weeks at physical  [facility]    There are no Patient Instructions on file for this visit.    Return in about 2 weeks (around 6/5/2024) for Annual physical.    Subjective:      Patient ID: Niels Grace is a 59 y.o. male.    Chief Complaint   Patient presents with   • Establish Care     Sas/cma   • Rash       Pt is here for the first time transferring from [place]  Pt states he could never get an appt  Here to establish    Pt states he has a rash on his skin - called to be seen and [facility] advised him to make an appt with a different primary.    Rash started two years ago would go away in the spring and summer, not this year its lingering and itchy and dry.  Tried to also see derm and could not be seen till the end of summer    No exposures  Pt works as a             The following portions of the patient's history were reviewed and updated as appropriate: allergies, current medications, past family history, past medical history, past social history, past surgical history and problem list.    Review of Systems   Constitutional:  Negative for activity change, appetite change, chills, diaphoresis, fatigue, fever and unexpected weight change.   HENT:  Negative for congestion, dental problem, ear pain, mouth sores, sinus pressure, sinus pain, sore throat and trouble swallowing.    Eyes:  " Negative for photophobia, discharge and itching.   Respiratory:  Negative for apnea, chest tightness and shortness of breath.    Cardiovascular:  Negative for chest pain, palpitations and leg swelling.   Gastrointestinal:  Negative for abdominal distention, abdominal pain, blood in stool, nausea and vomiting.   Endocrine: Negative for cold intolerance, heat intolerance, polydipsia, polyphagia and polyuria.   Genitourinary:  Negative for difficulty urinating.   Musculoskeletal:  Negative for arthralgias.   Skin:  Positive for rash. Negative for color change and wound.   Neurological:  Negative for dizziness, syncope, speech difficulty and headaches.   Hematological:  Negative for adenopathy.   Psychiatric/Behavioral:  Negative for agitation and behavioral problems.          Current Outpatient Medications   Medication Sig Dispense Refill   • cephalexin (KEFLEX) 500 mg capsule Take 1 capsule (500 mg total) by mouth every 12 (twelve) hours for 10 days 20 capsule 0   • fluticasone (FLONASE) 50 mcg/act nasal spray 1 spray into each nostril daily 16 g 5   • mometasone (ELOCON) 0.1 % cream Apply topically daily 45 g 0   • vardenafil (LEVITRA) 20 MG tablet TAKE ONE TABLET BY MOUTH DAILY AS NEEDED FOR ERECTILE DYSFUNCTION 4 tablet 10   • venlafaxine (EFFEXOR-XR) 37.5 mg 24 hr capsule Take 1 capsule (37.5 mg total) by mouth every morning 90 capsule 3     No current facility-administered medications for this visit.       Objective:    /80   Pulse 97   Temp 98.5 °F (36.9 °C)   Resp 18   Ht 5' 7\" (1.702 m)   Wt 94.8 kg (209 lb)   BMI 32.73 kg/m²        Physical Exam  Vitals and nursing note reviewed.   Constitutional:       General: He is not in acute distress.     Appearance: He is well-developed. He is not diaphoretic.   HENT:      Head: Normocephalic and atraumatic.      Right Ear: External ear normal.      Left Ear: External ear normal.      Nose: Nose normal.      Mouth/Throat:      Pharynx: No oropharyngeal " exudate.   Eyes:      General: No scleral icterus.        Right eye: No discharge.         Left eye: No discharge.      Pupils: Pupils are equal, round, and reactive to light.   Neck:      Thyroid: No thyromegaly.   Cardiovascular:      Rate and Rhythm: Normal rate.      Heart sounds: Normal heart sounds. No murmur heard.  Pulmonary:      Effort: Pulmonary effort is normal. No respiratory distress.      Breath sounds: Normal breath sounds. No wheezing.   Abdominal:      General: Bowel sounds are normal. There is no distension.      Palpations: Abdomen is soft. There is no mass.      Tenderness: There is no abdominal tenderness. There is no guarding or rebound.   Musculoskeletal:         General: Normal range of motion.   Skin:     General: Skin is warm and dry.      Findings: Rash present. No erythema.      Comments: Dry   Flaking excorioated rash on the rt shin   Neurological:      Mental Status: He is alert.      Coordination: Coordination normal.      Deep Tendon Reflexes: Reflexes normal.   Psychiatric:         Behavior: Behavior normal.                Frank Lombardi, DO

## 2024-06-07 ENCOUNTER — TELEPHONE (OUTPATIENT)
Dept: FAMILY MEDICINE CLINIC | Facility: CLINIC | Age: 59
End: 2024-06-07

## 2024-06-27 DIAGNOSIS — F41.9 ANXIETY: ICD-10-CM

## 2024-06-27 RX ORDER — VENLAFAXINE HYDROCHLORIDE 37.5 MG/1
37.5 CAPSULE, EXTENDED RELEASE ORAL EVERY MORNING
Qty: 90 CAPSULE | Refills: 3 | Status: SHIPPED | OUTPATIENT
Start: 2024-06-27

## 2024-07-26 DIAGNOSIS — R09.82 POST-NASAL DRIP: ICD-10-CM

## 2024-07-26 RX ORDER — FLUTICASONE PROPIONATE 50 MCG
1 SPRAY, SUSPENSION (ML) NASAL DAILY
Qty: 16 G | Refills: 2 | Status: SHIPPED | OUTPATIENT
Start: 2024-07-26

## 2024-07-26 NOTE — TELEPHONE ENCOUNTER
Reason for call:   [x] Refill   [] Prior Auth  [] Other:     Office:   [] PCP/Provider -   [x] Specialty/Provider -PULMONARY     Medication:     fluticasone (FLONASE) 50 mcg/act nasal spray       Dose/Frequency:     1 spray, Nasal, Daily       Quantity: 15 g    [id number] [place], [place] -  [address]     Does the patient have enough for 3 days?   [] Yes   [x] No - Send as HP to POD

## 2024-08-30 DIAGNOSIS — N52.8 OTHER MALE ERECTILE DYSFUNCTION: ICD-10-CM

## 2024-08-30 RX ORDER — VARDENAFIL HYDROCHLORIDE 20 MG/1
20 TABLET ORAL DAILY PRN
Qty: 4 TABLET | Refills: 10 | Status: SHIPPED | OUTPATIENT
Start: 2024-08-30